# Patient Record
Sex: FEMALE | Race: WHITE | NOT HISPANIC OR LATINO | Employment: OTHER | URBAN - METROPOLITAN AREA
[De-identification: names, ages, dates, MRNs, and addresses within clinical notes are randomized per-mention and may not be internally consistent; named-entity substitution may affect disease eponyms.]

---

## 2023-02-27 LAB
ALANINE AMINOTRANSFERASE (SGPT) (U/L) IN SER/PLAS: 19 U/L (ref 7–45)
ALBUMIN (G/DL) IN SER/PLAS: 4.3 G/DL (ref 3.4–5)
ALKALINE PHOSPHATASE (U/L) IN SER/PLAS: 87 U/L (ref 33–136)
ANION GAP IN SER/PLAS: 20 MMOL/L (ref 10–20)
ASPARTATE AMINOTRANSFERASE (SGOT) (U/L) IN SER/PLAS: 21 U/L (ref 9–39)
BASOPHILS (10*3/UL) IN BLOOD BY AUTOMATED COUNT: 0.08 X10E9/L (ref 0–0.1)
BASOPHILS/100 LEUKOCYTES IN BLOOD BY AUTOMATED COUNT: 1 % (ref 0–2)
BILIRUBIN TOTAL (MG/DL) IN SER/PLAS: 0.4 MG/DL (ref 0–1.2)
C REACTIVE PROTEIN (MG/L) IN SER/PLAS: <0.1 MG/DL
CALCIUM (MG/DL) IN SER/PLAS: 9.7 MG/DL (ref 8.6–10.6)
CARBON DIOXIDE, TOTAL (MMOL/L) IN SER/PLAS: 21 MMOL/L (ref 21–32)
CHLORIDE (MMOL/L) IN SER/PLAS: 102 MMOL/L (ref 98–107)
CREATININE (MG/DL) IN SER/PLAS: 0.98 MG/DL (ref 0.5–1.05)
EOSINOPHILS (10*3/UL) IN BLOOD BY AUTOMATED COUNT: 0.31 X10E9/L (ref 0–0.4)
EOSINOPHILS/100 LEUKOCYTES IN BLOOD BY AUTOMATED COUNT: 4.1 % (ref 0–6)
ERYTHROCYTE DISTRIBUTION WIDTH (RATIO) BY AUTOMATED COUNT: 12.9 % (ref 11.5–14.5)
ERYTHROCYTE MEAN CORPUSCULAR HEMOGLOBIN CONCENTRATION (G/DL) BY AUTOMATED: 32.2 G/DL (ref 32–36)
ERYTHROCYTE MEAN CORPUSCULAR VOLUME (FL) BY AUTOMATED COUNT: 95 FL (ref 80–100)
ERYTHROCYTES (10*6/UL) IN BLOOD BY AUTOMATED COUNT: 4.39 X10E12/L (ref 4–5.2)
GFR FEMALE: 60 ML/MIN/1.73M2
GLUCOSE (MG/DL) IN SER/PLAS: 91 MG/DL (ref 74–99)
HEMATOCRIT (%) IN BLOOD BY AUTOMATED COUNT: 41.9 % (ref 36–46)
HEMOGLOBIN (G/DL) IN BLOOD: 13.5 G/DL (ref 12–16)
IMMATURE GRANULOCYTES/100 LEUKOCYTES IN BLOOD BY AUTOMATED COUNT: 0.7 % (ref 0–0.9)
LEUKOCYTES (10*3/UL) IN BLOOD BY AUTOMATED COUNT: 7.6 X10E9/L (ref 4.4–11.3)
LYMPHOCYTES (10*3/UL) IN BLOOD BY AUTOMATED COUNT: 2.05 X10E9/L (ref 0.8–3)
LYMPHOCYTES/100 LEUKOCYTES IN BLOOD BY AUTOMATED COUNT: 26.9 % (ref 13–44)
MONOCYTES (10*3/UL) IN BLOOD BY AUTOMATED COUNT: 0.55 X10E9/L (ref 0.05–0.8)
MONOCYTES/100 LEUKOCYTES IN BLOOD BY AUTOMATED COUNT: 7.2 % (ref 2–10)
NEUTROPHILS (10*3/UL) IN BLOOD BY AUTOMATED COUNT: 4.59 X10E9/L (ref 1.6–5.5)
NEUTROPHILS/100 LEUKOCYTES IN BLOOD BY AUTOMATED COUNT: 60.1 % (ref 40–80)
NRBC (PER 100 WBCS) BY AUTOMATED COUNT: 0 /100 WBC (ref 0–0)
PLATELETS (10*3/UL) IN BLOOD AUTOMATED COUNT: 304 X10E9/L (ref 150–450)
POTASSIUM (MMOL/L) IN SER/PLAS: 4.3 MMOL/L (ref 3.5–5.3)
PROTEIN TOTAL: 6.7 G/DL (ref 6.4–8.2)
SEDIMENTATION RATE, ERYTHROCYTE: 14 MM/H (ref 0–30)
SODIUM (MMOL/L) IN SER/PLAS: 139 MMOL/L (ref 136–145)
UREA NITROGEN (MG/DL) IN SER/PLAS: 22 MG/DL (ref 6–23)

## 2023-02-28 LAB
ANA PATTERN: ABNORMAL
ANA TITER: ABNORMAL
ANTI-NUCLEAR ANTIBODY (ANA): POSITIVE

## 2023-03-01 LAB
ANTI-CENTROMERE: <0.2 AI
ANTI-CHROMATIN: <0.2 AI
ANTI-DNA (DS): <1 IU/ML
ANTI-JO-1 IGG: <0.2 AI
ANTI-RIBOSOMAL P: <0.2 AI
ANTI-RNP: 0.4 AI
ANTI-SCL-70: <0.2 AI
ANTI-SM/RNP: <0.2 AI
ANTI-SM: <0.2 AI
ANTI-SSA: <0.2 AI
ANTI-SSB: <0.2 AI

## 2023-03-20 ENCOUNTER — TELEPHONE (OUTPATIENT)
Dept: PRIMARY CARE | Facility: CLINIC | Age: 78
End: 2023-03-20

## 2023-06-26 DIAGNOSIS — E78.49 OTHER HYPERLIPIDEMIA: ICD-10-CM

## 2023-06-26 RX ORDER — ROSUVASTATIN CALCIUM 5 MG/1
5 TABLET, COATED ORAL NIGHTLY
Qty: 90 TABLET | Refills: 0 | Status: SHIPPED | OUTPATIENT
Start: 2023-06-26 | End: 2023-09-25

## 2023-07-03 DIAGNOSIS — I10 HYPERTENSION, UNSPECIFIED TYPE: Primary | ICD-10-CM

## 2023-07-05 DIAGNOSIS — I10 HYPERTENSION, UNSPECIFIED TYPE: Primary | ICD-10-CM

## 2023-07-05 RX ORDER — AMLODIPINE BESYLATE 2.5 MG/1
TABLET ORAL
Qty: 90 TABLET | Refills: 3 | Status: SHIPPED | OUTPATIENT
Start: 2023-07-05

## 2023-07-06 RX ORDER — AMLODIPINE BESYLATE 5 MG/1
TABLET ORAL
Qty: 90 TABLET | Refills: 3 | Status: SHIPPED | OUTPATIENT
Start: 2023-07-06

## 2023-08-16 ENCOUNTER — APPOINTMENT (OUTPATIENT)
Dept: PRIMARY CARE | Facility: CLINIC | Age: 78
End: 2023-08-16
Payer: MEDICARE

## 2023-08-21 ENCOUNTER — OFFICE VISIT (OUTPATIENT)
Dept: PRIMARY CARE | Facility: CLINIC | Age: 78
End: 2023-08-21
Payer: MEDICARE

## 2023-08-21 VITALS
DIASTOLIC BLOOD PRESSURE: 70 MMHG | OXYGEN SATURATION: 96 % | HEIGHT: 64 IN | SYSTOLIC BLOOD PRESSURE: 136 MMHG | HEART RATE: 71 BPM | BODY MASS INDEX: 24.28 KG/M2 | WEIGHT: 142.2 LBS | RESPIRATION RATE: 16 BRPM

## 2023-08-21 DIAGNOSIS — I10 HYPERTENSION, UNSPECIFIED TYPE: ICD-10-CM

## 2023-08-21 DIAGNOSIS — Z85.820 HISTORY OF MELANOMA: ICD-10-CM

## 2023-08-21 DIAGNOSIS — R07.81 RIB PAIN: ICD-10-CM

## 2023-08-21 DIAGNOSIS — Z12.31 ENCOUNTER FOR SCREENING MAMMOGRAM FOR MALIGNANT NEOPLASM OF BREAST: ICD-10-CM

## 2023-08-21 DIAGNOSIS — E78.5 HYPERLIPIDEMIA, UNSPECIFIED HYPERLIPIDEMIA TYPE: ICD-10-CM

## 2023-08-21 DIAGNOSIS — Z00.00 ENCOUNTER FOR SUBSEQUENT ANNUAL WELLNESS VISIT IN MEDICARE PATIENT: Primary | ICD-10-CM

## 2023-08-21 DIAGNOSIS — R42 DIZZINESS: ICD-10-CM

## 2023-08-21 DIAGNOSIS — R51.9 OCCIPITAL HEADACHE: ICD-10-CM

## 2023-08-21 DIAGNOSIS — M35.3 POLYMYALGIA RHEUMATICA (MULTI): ICD-10-CM

## 2023-08-21 PROBLEM — J30.2 ACUTE SEASONAL ALLERGIC RHINITIS: Status: ACTIVE | Noted: 2023-08-21

## 2023-08-21 PROBLEM — Z12.39 SCREENING FOR BREAST CANCER: Status: ACTIVE | Noted: 2023-08-21

## 2023-08-21 PROCEDURE — 1036F TOBACCO NON-USER: CPT | Performed by: SPECIALIST

## 2023-08-21 PROCEDURE — G0439 PPPS, SUBSEQ VISIT: HCPCS | Performed by: SPECIALIST

## 2023-08-21 PROCEDURE — 1159F MED LIST DOCD IN RCRD: CPT | Performed by: SPECIALIST

## 2023-08-21 PROCEDURE — 1125F AMNT PAIN NOTED PAIN PRSNT: CPT | Performed by: SPECIALIST

## 2023-08-21 PROCEDURE — 3078F DIAST BP <80 MM HG: CPT | Performed by: SPECIALIST

## 2023-08-21 PROCEDURE — 3075F SYST BP GE 130 - 139MM HG: CPT | Performed by: SPECIALIST

## 2023-08-21 PROCEDURE — 1170F FXNL STATUS ASSESSED: CPT | Performed by: SPECIALIST

## 2023-08-21 PROCEDURE — 99397 PER PM REEVAL EST PAT 65+ YR: CPT | Performed by: SPECIALIST

## 2023-08-21 RX ORDER — PREDNISONE 1 MG/1
TABLET ORAL
COMMUNITY
Start: 2023-02-27 | End: 2023-12-27

## 2023-08-21 RX ORDER — PREDNISONE 5 MG/1
TABLET ORAL
COMMUNITY
Start: 2023-03-16

## 2023-08-21 RX ORDER — MELATONIN 3 MG
1 CAPSULE ORAL NIGHTLY PRN
COMMUNITY

## 2023-08-21 ASSESSMENT — PATIENT HEALTH QUESTIONNAIRE - PHQ9
1. LITTLE INTEREST OR PLEASURE IN DOING THINGS: NOT AT ALL
SUM OF ALL RESPONSES TO PHQ9 QUESTIONS 1 AND 2: 0
SUM OF ALL RESPONSES TO PHQ9 QUESTIONS 1 AND 2: 0
2. FEELING DOWN, DEPRESSED OR HOPELESS: NOT AT ALL
1. LITTLE INTEREST OR PLEASURE IN DOING THINGS: NOT AT ALL
2. FEELING DOWN, DEPRESSED OR HOPELESS: NOT AT ALL

## 2023-08-21 ASSESSMENT — ACTIVITIES OF DAILY LIVING (ADL)
GROCERY_SHOPPING: INDEPENDENT
TAKING_MEDICATION: INDEPENDENT
BATHING: INDEPENDENT
DRESSING: INDEPENDENT
DOING_HOUSEWORK: INDEPENDENT
MANAGING_FINANCES: INDEPENDENT

## 2023-08-21 ASSESSMENT — ENCOUNTER SYMPTOMS
DEPRESSION: 0
OCCASIONAL FEELINGS OF UNSTEADINESS: 0
LOSS OF SENSATION IN FEET: 0

## 2023-08-21 NOTE — PROGRESS NOTES
Subjective   Patient ID: Xiomara Rader is a 78 y.o. female who presents for Medicare Annual Wellness Visit Initial, Dizziness (Patient stated she has episodes of dizziness.), Shortness of Breath, Headache (Back of head.), and Hemorrhoids.      HPI    79 yo female Pmhx sig for Anxiety/Depression, PMR, Melanoma (Stage 0 right arm) Fam Hx of Melanoma here for MAW    Now on 7 mg prednisone (has 5 mg prednisone and 1 mg tabs) and needs mail order refill, sees Dr Ochoa.  Had been off of prednisone x 1 year and then PMR sx recurred.  Reduces 1 mg per month (was on 12 and goes to 6 mg in Sept)    Taking an occasional antihistamine from [facility] (a generic clairtin)  Has a lot of nasal congestion, has deviated septum and always feels congested  Uses nasal saline at night    Headache in Back of head and dizziness   Said she was told ortho-stasis with insurance home provider  Back of head headache has been present off/on for about a month, describes as a low grade dull pain and occasional zap   Took an ibuprofen earlier today     Also said dizzy for several weeks, last week lasted 2 days in a row  Said if turns quickly some times must grab something to steady herself  Can be room spinning sensation but when working around house more of an unsteady sensation, can trigger with position change, no associated presyncope no palpitations    Off/on shortness of breath episodic for an hour or two and resolves after an antihistamine    Plans to visit [place] with 3 friends and go to the [facility] in October     Seeing derm for Melanoma  (son also had)    Allergies   Allergen Reactions    Clindamycin Rash    Penicillins Rash    Sulfa (Sulfonamide Antibiotics) Rash      Current Outpatient Medications   Medication Instructions    amLODIPine (Norvasc) 2.5 mg tablet TAKE 1 TABLET DAILY (TO BE TAKEN ALONG WITH 5 MG AMLODIPINE FOR TOTAL DOSE 7.5 MG DAILY)    amLODIPine (Norvasc) 5 mg tablet TAKE 1 TABLET DAILY    melatonin 3 mg capsule 1 tablet,  "oral, Nightly PRN    predniSONE (Deltasone) 1 mg tablet oral    predniSONE (Deltasone) 5 mg tablet oral    rosuvastatin (CRESTOR) 5 mg, oral, Nightly        Review of Systems  Constitutional  No fatigue, no fevers, no chills, no unintentional weight loss,   HEENT:  Positive headaches, Episodic unsteadiness and dizziness, no double vision, no blurred vision, no hearing loss  Cardiovascular:  No chest pain, no palpitations, no shortness of breath with exertion (one flight of stairs)  Respiratory:  No cough, no hemoptysis, no wheezing, Occasional if she doesn't take an antihistamine shortness of breath at rest  GI:  No dysphagia, no odynophagia, Occasional reflux if eats tomato based food, no abdominal pain, no nausea, no vomiting, no changes in bowel bladder habits, no bright red blood per rectum, no melena  :  No urinary frequency, no dysuria, no urine incontinence  MSK:  No falls, no joint pain, no joint swelling  Neuro:  No tremors, no extremity weakness, no changes in sensation  Breasts:  No abnormalities on self breast exam no nipple discharge no skin changes    Physical Exam  /70   Pulse 71   Resp 16   Ht 1.623 m (5' 3.9\")   Wt 64.5 kg (142 lb 3.2 oz)   SpO2 96%   BMI 24.49 kg/m²   Supine HR 65 140/78 Standing HR 70 and /70 (did not drop below  upon when first stood)   General:    Well-appearing  F in no acute distress, well nourished, well hydrated  Head:  Normocephalic, atraumatic  Skin:          Warm dry,  Eyes:  Anicteric sclera, pupils equal,   Ears:        TMs intact  Oral:      Not examined due to pandemic  Neck:   Supple, no cervical/supraclavicular adenopathy, no thyromegaly or nodules appreciated on exam  Cor:      Regular rate, normal S1, S2, no murmurs appreciated, no S3, no S4   Lungs:   Clear to auscultation b/l, no wheezes, no rhonchi, no crackles, no accessory respiratory muscle use  Abd:          Soft, nontender, no guarding, no rebound, no hepatosplenomegaly " appreciated   Ext:            No lower extremity edema, no palpable cords  Pulses:      Pedal pulses intact  Neuro:   CN2-12 grossly intact, cerebellar function intact with finger nose, heel shin, no pronator drift, heel-toe gait intact, tip toe gait and heel gait intact, negative Rhomberg, Kaur pike performed with head left no nystagmus but reproduced minor symptoms   Breasts:     Declined,     Assessment/Plan   Problem List Items Addressed This Visit       Encounter for subsequent annual wellness visit in Medicare patient - Primary     -Continue annual influenza vaccine   -Previously received 2 Covid vaccines 2 boosters and bivalent vaccine 9/2022, plans to get covid booster  -Previously received Shingrix vaccines  -Previously received PPSV 23 1/2013, recommend repeat PPSV23 or PCV20  -Previously received PCV 13 10/2019  -Previously received Tdap 10/2021  -Plans to get RSV vaccine this fall  -Mammogram 3/2022, discussed, ordered  -Colonoscopy 2014 in [facility], said no further recommended  -Prior Screen Hep C 12/2019  -DXA 4/2023 was normal (prior was osteopenia), management per Rheum         Relevant Orders    CBC (Completed)    Comprehensive Metabolic Panel (Completed)    Lipid Panel (Completed)    Hypertension     /70 today  Recommended home blood pressure monitoring  Continue 7.5 mg amlodipine daily   Goal less than 140/90, ideally less than 130/70           Relevant Orders    CBC (Completed)    Comprehensive Metabolic Panel (Completed)    Hyperlipidemia     On rosuvastatin 5 mg daily  LDL was 113 (2021)  Labs ordered CMP Fx Lipids         Relevant Orders    Comprehensive Metabolic Panel (Completed)    Lipid Panel (Completed)    Polymyalgia rheumatica (CMS/HCC)     Management per Rheum  On Prednisone taper (On 7 mg prednisone, has 5 mg prednisone and 1 mg tabs, and reduces 1 mg per month (was on 12 and goes to 6 mg in Sept)           Relevant Orders    CBC (Completed)    Screening for breast cancer     Relevant Orders    BI mammo bilateral screening tomosynthesis    History of melanoma     Management per dermatology         Occipital headache     Neuro exam today non-focal  Discussed imaging with MRI brain, said she would consider if symptoms persist          Dizziness     Neuro exam normal today  Discussed MRI Brain, she will consider if symptoms persist  Was not orthostatic today   Suspect BPPV given mild symptoms on cheema pike maneuver, consider vestibular therapy if sx persist         Rib pain     Pain over right lower rib cage  Declined xray, opted to monitor clinically and let me know if sx worsen or fail to resolve                 Elli Quevedo, DO

## 2023-08-21 NOTE — ASSESSMENT & PLAN NOTE
-Continue annual influenza vaccine   -Previously received 2 Covid vaccines 2 boosters and bivalent vaccine 9/2022, plans to get covid booster  -Previously received Shingrix vaccines  -Previously received PPSV 23 1/2013, recommend repeat PPSV23 or PCV20  -Previously received PCV 13 10/2019  -Previously received Tdap 10/2021  -Plans to get RSV vaccine this fall  -Mammogram 3/2022, discussed, ordered  -Colonoscopy 2014 in [facility], said no further recommended  -Prior Screen Hep C 12/2019  -DXA 4/2023 was normal (prior was osteopenia), management per Rheum

## 2023-08-23 ENCOUNTER — LAB (OUTPATIENT)
Dept: LAB | Facility: LAB | Age: 78
End: 2023-08-23
Payer: MEDICARE

## 2023-08-23 DIAGNOSIS — Z00.00 ENCOUNTER FOR SUBSEQUENT ANNUAL WELLNESS VISIT IN MEDICARE PATIENT: ICD-10-CM

## 2023-08-23 DIAGNOSIS — M35.3 POLYMYALGIA RHEUMATICA (MULTI): ICD-10-CM

## 2023-08-23 DIAGNOSIS — I10 HYPERTENSION, UNSPECIFIED TYPE: ICD-10-CM

## 2023-08-23 DIAGNOSIS — E78.5 HYPERLIPIDEMIA, UNSPECIFIED HYPERLIPIDEMIA TYPE: ICD-10-CM

## 2023-08-23 LAB
ALANINE AMINOTRANSFERASE (SGPT) (U/L) IN SER/PLAS: 18 U/L (ref 7–45)
ALBUMIN (G/DL) IN SER/PLAS: 4.1 G/DL (ref 3.4–5)
ALKALINE PHOSPHATASE (U/L) IN SER/PLAS: 84 U/L (ref 33–136)
ANION GAP IN SER/PLAS: 10 MMOL/L (ref 10–20)
ASPARTATE AMINOTRANSFERASE (SGOT) (U/L) IN SER/PLAS: 18 U/L (ref 9–39)
BILIRUBIN TOTAL (MG/DL) IN SER/PLAS: 0.6 MG/DL (ref 0–1.2)
CALCIUM (MG/DL) IN SER/PLAS: 9.4 MG/DL (ref 8.6–10.6)
CARBON DIOXIDE, TOTAL (MMOL/L) IN SER/PLAS: 29 MMOL/L (ref 21–32)
CHLORIDE (MMOL/L) IN SER/PLAS: 107 MMOL/L (ref 98–107)
CHOLESTEROL (MG/DL) IN SER/PLAS: 153 MG/DL (ref 0–199)
CHOLESTEROL IN HDL (MG/DL) IN SER/PLAS: 81.1 MG/DL
CHOLESTEROL/HDL RATIO: 1.9
CREATININE (MG/DL) IN SER/PLAS: 0.89 MG/DL (ref 0.5–1.05)
ERYTHROCYTE DISTRIBUTION WIDTH (RATIO) BY AUTOMATED COUNT: 13.1 % (ref 11.5–14.5)
ERYTHROCYTE MEAN CORPUSCULAR HEMOGLOBIN CONCENTRATION (G/DL) BY AUTOMATED: 32.4 G/DL (ref 32–36)
ERYTHROCYTE MEAN CORPUSCULAR VOLUME (FL) BY AUTOMATED COUNT: 99 FL (ref 80–100)
ERYTHROCYTES (10*6/UL) IN BLOOD BY AUTOMATED COUNT: 4.38 X10E12/L (ref 4–5.2)
GFR FEMALE: 67 ML/MIN/1.73M2
GLUCOSE (MG/DL) IN SER/PLAS: 86 MG/DL (ref 74–99)
HEMATOCRIT (%) IN BLOOD BY AUTOMATED COUNT: 43.5 % (ref 36–46)
HEMOGLOBIN (G/DL) IN BLOOD: 14.1 G/DL (ref 12–16)
LDL: 58 MG/DL (ref 0–99)
LEUKOCYTES (10*3/UL) IN BLOOD BY AUTOMATED COUNT: 6.3 X10E9/L (ref 4.4–11.3)
NRBC (PER 100 WBCS) BY AUTOMATED COUNT: 0 /100 WBC (ref 0–0)
PLATELETS (10*3/UL) IN BLOOD AUTOMATED COUNT: 288 X10E9/L (ref 150–450)
POTASSIUM (MMOL/L) IN SER/PLAS: 4.5 MMOL/L (ref 3.5–5.3)
PROTEIN TOTAL: 6.6 G/DL (ref 6.4–8.2)
SODIUM (MMOL/L) IN SER/PLAS: 141 MMOL/L (ref 136–145)
TRIGLYCERIDE (MG/DL) IN SER/PLAS: 70 MG/DL (ref 0–149)
UREA NITROGEN (MG/DL) IN SER/PLAS: 16 MG/DL (ref 6–23)
VLDL: 14 MG/DL (ref 0–40)

## 2023-08-23 PROCEDURE — 36415 COLL VENOUS BLD VENIPUNCTURE: CPT

## 2023-08-23 PROCEDURE — 80061 LIPID PANEL: CPT

## 2023-08-23 PROCEDURE — 85027 COMPLETE CBC AUTOMATED: CPT

## 2023-08-23 PROCEDURE — 80053 COMPREHEN METABOLIC PANEL: CPT

## 2023-08-24 PROBLEM — R07.81 RIB PAIN: Status: ACTIVE | Noted: 2023-08-24

## 2023-08-24 PROBLEM — R42 DIZZINESS: Status: ACTIVE | Noted: 2023-08-24

## 2023-08-24 PROBLEM — R51.9 OCCIPITAL HEADACHE: Status: ACTIVE | Noted: 2023-08-24

## 2023-08-24 PROBLEM — Z85.820 HISTORY OF MELANOMA: Status: ACTIVE | Noted: 2023-08-24

## 2023-08-24 NOTE — ASSESSMENT & PLAN NOTE
Pain over right lower rib cage  Declined xray, opted to monitor clinically and let me know if sx worsen or fail to resolve

## 2023-08-24 NOTE — ASSESSMENT & PLAN NOTE
Neuro exam normal today  Discussed MRI Brain, she will consider if symptoms persist  Was not orthostatic today   Suspect BPPV given mild symptoms on cheema pike maneuver, consider vestibular therapy if sx persist

## 2023-08-24 NOTE — ASSESSMENT & PLAN NOTE
/70 today  Recommended home blood pressure monitoring  Continue 7.5 mg amlodipine daily   Goal less than 140/90, ideally less than 130/70

## 2023-08-24 NOTE — ASSESSMENT & PLAN NOTE
Neuro exam today non-focal  Discussed imaging with MRI brain, said she would consider if symptoms persist

## 2023-08-24 NOTE — ASSESSMENT & PLAN NOTE
Management per Rheum  On Prednisone taper (On 7 mg prednisone, has 5 mg prednisone and 1 mg tabs, and reduces 1 mg per month (was on 12 and goes to 6 mg in Sept)

## 2023-09-22 DIAGNOSIS — E78.49 OTHER HYPERLIPIDEMIA: ICD-10-CM

## 2023-09-25 ENCOUNTER — TELEPHONE (OUTPATIENT)
Dept: PRIMARY CARE | Facility: CLINIC | Age: 78
End: 2023-09-25
Payer: MEDICARE

## 2023-09-25 RX ORDER — ROSUVASTATIN CALCIUM 5 MG/1
5 TABLET, COATED ORAL NIGHTLY
Qty: 90 TABLET | Refills: 2 | Status: SHIPPED | OUTPATIENT
Start: 2023-09-25

## 2023-10-30 ENCOUNTER — TELEPHONE (OUTPATIENT)
Dept: PRIMARY CARE | Facility: CLINIC | Age: 78
End: 2023-10-30
Payer: MEDICARE

## 2023-10-30 NOTE — TELEPHONE ENCOUNTER
Patient just came back from [place] and tested positive today for covid she has lost of taste but no other symptoms.

## 2023-12-10 ASSESSMENT — DERMATOLOGY QUALITY OF LIFE (QOL) ASSESSMENT
RATE HOW BOTHERED YOU ARE BY EFFECTS OF YOUR SKIN PROBLEMS ON YOUR ACTIVITIES (EG, GOING OUT, ACCOMPLISHING WHAT YOU WANT, WORK ACTIVITIES OR YOUR RELATIONSHIPS WITH OTHERS): 0 - NEVER BOTHERED
RATE HOW EMOTIONALLY BOTHERED YOU ARE BY YOUR SKIN PROBLEM (FOR EXAMPLE, WORRY, EMBARRASSMENT, FRUSTRATION): 0 - NEVER BOTHERED
RATE HOW EMOTIONALLY BOTHERED YOU ARE BY YOUR SKIN PROBLEM (FOR EXAMPLE, WORRY, EMBARRASSMENT, FRUSTRATION): 0 - NEVER BOTHERED
RATE HOW BOTHERED YOU ARE BY EFFECTS OF YOUR SKIN PROBLEMS ON YOUR ACTIVITIES (EG, GOING OUT, ACCOMPLISHING WHAT YOU WANT, WORK ACTIVITIES OR YOUR RELATIONSHIPS WITH OTHERS): 0 - NEVER BOTHERED

## 2023-12-12 ENCOUNTER — OFFICE VISIT (OUTPATIENT)
Dept: DERMATOLOGY | Facility: CLINIC | Age: 78
End: 2023-12-12
Payer: MEDICARE

## 2023-12-12 DIAGNOSIS — D22.9 MULTIPLE BENIGN NEVI: Primary | ICD-10-CM

## 2023-12-12 DIAGNOSIS — L81.4 LENTIGO: ICD-10-CM

## 2023-12-12 DIAGNOSIS — L82.1 SEBORRHEIC KERATOSIS: ICD-10-CM

## 2023-12-12 DIAGNOSIS — Z85.820 PERSONAL HISTORY OF MALIGNANT MELANOMA OF SKIN: ICD-10-CM

## 2023-12-12 PROCEDURE — 1160F RVW MEDS BY RX/DR IN RCRD: CPT | Performed by: DERMATOLOGY

## 2023-12-12 PROCEDURE — 1159F MED LIST DOCD IN RCRD: CPT | Performed by: DERMATOLOGY

## 2023-12-12 PROCEDURE — 1036F TOBACCO NON-USER: CPT | Performed by: DERMATOLOGY

## 2023-12-12 PROCEDURE — 1125F AMNT PAIN NOTED PAIN PRSNT: CPT | Performed by: DERMATOLOGY

## 2023-12-12 PROCEDURE — 99213 OFFICE O/P EST LOW 20 MIN: CPT | Performed by: DERMATOLOGY

## 2023-12-12 ASSESSMENT — ITCH NUMERIC RATING SCALE: HOW SEVERE IS YOUR ITCHING?: 0

## 2023-12-12 ASSESSMENT — PATIENT GLOBAL ASSESSMENT (PGA): PATIENT GLOBAL ASSESSMENT: PATIENT GLOBAL ASSESSMENT:  1 - CLEAR

## 2023-12-12 ASSESSMENT — DERMATOLOGY PATIENT ASSESSMENT
DO YOU USE A TANNING BED: YES, PREVIOUSLY
ARE YOU AN ORGAN TRANSPLANT RECIPIENT: NO
DO YOU USE SUNSCREEN: OCCASIONALLY
HAVE YOU HAD OR DO YOU HAVE VASCULAR DISEASE: NO
ARE YOU ON BIRTH CONTROL: NO
DO YOU HAVE IRREGULAR MENSTRUAL CYCLES: NO
HAVE YOU HAD OR DO YOU HAVE A STAPH INFECTION: NO
ARE YOU TRYING TO GET PREGNANT: NO

## 2023-12-12 ASSESSMENT — DERMATOLOGY QUALITY OF LIFE (QOL) ASSESSMENT
RATE HOW EMOTIONALLY BOTHERED YOU ARE BY YOUR SKIN PROBLEM (FOR EXAMPLE, WORRY, EMBARRASSMENT, FRUSTRATION): 0 - NEVER BOTHERED
RATE HOW BOTHERED YOU ARE BY EFFECTS OF YOUR SKIN PROBLEMS ON YOUR ACTIVITIES (EG, GOING OUT, ACCOMPLISHING WHAT YOU WANT, WORK ACTIVITIES OR YOUR RELATIONSHIPS WITH OTHERS): 0 - NEVER BOTHERED
DATE THE QUALITY-OF-LIFE ASSESSMENT WAS COMPLETED: 66820
RATE HOW BOTHERED YOU ARE BY SYMPTOMS OF YOUR SKIN PROBLEM (EG, ITCHING, STINGING BURNING, HURTING OR SKIN IRRITATION): 0 - NEVER BOTHERED

## 2023-12-12 NOTE — PROGRESS NOTES
Subjective     Xiomara Rader is a 78 y.o. female who presents for the following: Skin Check.     Review of Systems:  No other skin or systemic complaints other than what is documented elsewhere in the note.    The following portions of the chart were reviewed this encounter and updated as appropriate:  Tobacco  Allergies  Meds  Problems  Med Hx  Surg Hx         Skin Cancer History  No skin cancer on file.      Specialty Problems          Dermatology Problems    History of melanoma     Melanoma 1: Melanoma in situYear Diagnosed:  2023. January.Location:  Right Dorsal Forearm.Treatment(s):  Mohs 4-4-23              Objective   Well appearing patient in no apparent distress; mood and affect are within normal limits.    A full examination was performed including scalp, head, eyes, ears, nose, lips, neck, chest, axillae, abdomen, back, buttocks, bilateral upper extremities, bilateral lower extremities, hands, feet, fingers, toes, fingernails, and toenails. All findings within normal limits unless otherwise noted below.    Assessment/Plan   1. Multiple benign nevi  Brown and tan macules and papules with reassuring findings on dermoscopy    -These lesions have benign, reassuring patterns on dermoscopy  -Recommend continued self observation, and to contact the office if any changes in nevi are noticed    2. Lentigo  Tan macules    -Benign appearing on exam  -Reassurance, recommend observation    3. Seborrheic keratosis  Stuck on, waxy macule(s)/papule(s)/plaque(s) with comedo-like openings and milia like cysts    -Discussed the nature of the diagnosis  -Reassurance, recommend continued observation    4. Personal history of malignant melanoma of skin  Lymph node basins palpated in relevant regions without appreciable adenopathy; regions include the neck and/or supraclavicular and/or axillary and/or inguinal and/or popliteal skin.    Personal History of Malignant Melanoma  -Well healed scar(s) with no evidence of  recurrence  -Discussed the need for regular full skin examinations in the office, and monthly self examinations at home  -Discussed the need for annual ophthalmology exams with dilation  -Discussed concerning lesions and when to return sooner if any changes noted in skin lesions. Patient verbalizes understanding.    Related Procedures  Follow Up In Dermatology - Established Patient      Follow up 6 months Full Skin Exam  If normal atnext visit can consider 1 year

## 2023-12-23 DIAGNOSIS — M35.3 POLYMYALGIA RHEUMATICA (MULTI): Primary | ICD-10-CM

## 2023-12-27 RX ORDER — PREDNISONE 1 MG/1
TABLET ORAL
Qty: 120 TABLET | Refills: 8 | Status: SHIPPED | OUTPATIENT
Start: 2023-12-27 | End: 2024-04-08 | Stop reason: SDUPTHER

## 2024-04-08 DIAGNOSIS — M35.3 POLYMYALGIA RHEUMATICA (MULTI): ICD-10-CM

## 2024-04-08 RX ORDER — PREDNISONE 1 MG/1
TABLET ORAL
Qty: 90 TABLET | Refills: 0 | Status: SHIPPED | OUTPATIENT
Start: 2024-04-08

## 2024-06-10 ENCOUNTER — OFFICE VISIT (OUTPATIENT)
Dept: RHEUMATOLOGY | Facility: CLINIC | Age: 79
End: 2024-06-10
Payer: MEDICARE

## 2024-06-10 VITALS
WEIGHT: 140.6 LBS | DIASTOLIC BLOOD PRESSURE: 77 MMHG | HEART RATE: 82 BPM | BODY MASS INDEX: 24.21 KG/M2 | OXYGEN SATURATION: 96 % | SYSTOLIC BLOOD PRESSURE: 120 MMHG

## 2024-06-10 DIAGNOSIS — M19.042 PRIMARY OSTEOARTHRITIS OF BOTH HANDS: ICD-10-CM

## 2024-06-10 DIAGNOSIS — M81.0 OSTEOPOROSIS, UNSPECIFIED OSTEOPOROSIS TYPE, UNSPECIFIED PATHOLOGICAL FRACTURE PRESENCE: Primary | ICD-10-CM

## 2024-06-10 DIAGNOSIS — M19.041 PRIMARY OSTEOARTHRITIS OF BOTH HANDS: ICD-10-CM

## 2024-06-10 DIAGNOSIS — M35.3 POLYMYALGIA RHEUMATICA (MULTI): ICD-10-CM

## 2024-06-10 DIAGNOSIS — M79.10 MYALGIA: ICD-10-CM

## 2024-06-10 PROBLEM — D18.01 HEMANGIOMA OF SKIN AND SUBCUTANEOUS TISSUE: Status: ACTIVE | Noted: 2023-06-22

## 2024-06-10 PROBLEM — M25.50 ARTHRALGIA: Status: ACTIVE | Noted: 2024-06-10

## 2024-06-10 PROBLEM — L81.4 OTHER MELANIN HYPERPIGMENTATION: Status: ACTIVE | Noted: 2023-06-22

## 2024-06-10 PROBLEM — D64.9 ANEMIA: Status: ACTIVE | Noted: 2024-06-10

## 2024-06-10 PROBLEM — R53.83 FATIGUE: Status: ACTIVE | Noted: 2024-06-10

## 2024-06-10 PROBLEM — D22.70 MELANOCYTIC NEVI OF UNSPECIFIED LOWER LIMB, INCLUDING HIP: Status: ACTIVE | Noted: 2023-06-22

## 2024-06-10 PROBLEM — S46.012A ROTATOR CUFF STRAIN, LEFT, INITIAL ENCOUNTER: Status: ACTIVE | Noted: 2024-06-10

## 2024-06-10 PROBLEM — N81.6 RECTOCELE, FEMALE: Status: ACTIVE | Noted: 2024-06-10

## 2024-06-10 PROBLEM — L90.5 SCAR CONDITION AND FIBROSIS OF SKIN: Status: ACTIVE | Noted: 2023-06-22

## 2024-06-10 PROBLEM — M79.672 LEFT FOOT PAIN: Status: ACTIVE | Noted: 2024-06-10

## 2024-06-10 PROBLEM — M25.562 LEFT KNEE PAIN: Status: ACTIVE | Noted: 2024-06-10

## 2024-06-10 PROBLEM — M25.662 STIFFNESS OF LEFT KNEE, NOT ELSEWHERE CLASSIFIED: Status: ACTIVE | Noted: 2024-06-10

## 2024-06-10 PROBLEM — M17.12 LOCALIZED PRIMARY OSTEOARTHRITIS OF LEFT LOWER LEG: Status: ACTIVE | Noted: 2024-06-10

## 2024-06-10 PROBLEM — M25.519 SHOULDER PAIN: Status: ACTIVE | Noted: 2024-06-10

## 2024-06-10 PROBLEM — T78.40XA ALLERGIES: Status: ACTIVE | Noted: 2024-06-10

## 2024-06-10 PROBLEM — N81.10 CYSTOCELE, UNSPECIFIED: Status: ACTIVE | Noted: 2024-06-10

## 2024-06-10 PROBLEM — R00.0 RAPID HEART RATE: Status: ACTIVE | Noted: 2024-06-10

## 2024-06-10 PROBLEM — F41.9 ANXIETY AND DEPRESSION: Status: ACTIVE | Noted: 2024-06-10

## 2024-06-10 PROBLEM — R19.7 DIARRHEA: Status: ACTIVE | Noted: 2024-06-10

## 2024-06-10 PROBLEM — D22.62 MELANOCYTIC NEVI OF LEFT UPPER LIMB, INCLUDING SHOULDER: Status: ACTIVE | Noted: 2023-06-22

## 2024-06-10 PROBLEM — E73.9 LACTOSE INTOLERANCE: Status: ACTIVE | Noted: 2024-06-10

## 2024-06-10 PROBLEM — M19.90 OSTEOARTHRITIS: Status: ACTIVE | Noted: 2024-06-10

## 2024-06-10 PROBLEM — M75.81 ROTATOR CUFF TENDONITIS, RIGHT: Status: ACTIVE | Noted: 2024-06-10

## 2024-06-10 PROBLEM — M77.52: Status: ACTIVE | Noted: 2024-06-10

## 2024-06-10 PROBLEM — F32.A ANXIETY AND DEPRESSION: Status: ACTIVE | Noted: 2024-06-10

## 2024-06-10 PROBLEM — M85.80 OSTEOPENIA: Status: ACTIVE | Noted: 2024-06-10

## 2024-06-10 PROBLEM — D22.5 MELANOCYTIC NEVI OF TRUNK: Status: ACTIVE | Noted: 2023-06-22

## 2024-06-10 PROBLEM — D03.61 MELANOMA IN SITU OF RIGHT UPPER LIMB, INCLUDING SHOULDER (MULTI): Status: ACTIVE | Noted: 2023-06-22

## 2024-06-10 PROBLEM — L82.1 OTHER SEBORRHEIC KERATOSIS: Status: ACTIVE | Noted: 2023-06-22

## 2024-06-10 PROBLEM — R94.39 ABNORMAL STRESS ECHO: Status: ACTIVE | Noted: 2024-06-10

## 2024-06-10 PROBLEM — D48.5 NEOPLASM OF UNCERTAIN BEHAVIOR OF SKIN: Status: ACTIVE | Noted: 2023-06-22

## 2024-06-10 PROCEDURE — 1036F TOBACCO NON-USER: CPT | Performed by: STUDENT IN AN ORGANIZED HEALTH CARE EDUCATION/TRAINING PROGRAM

## 2024-06-10 PROCEDURE — 3074F SYST BP LT 130 MM HG: CPT | Performed by: STUDENT IN AN ORGANIZED HEALTH CARE EDUCATION/TRAINING PROGRAM

## 2024-06-10 PROCEDURE — 3078F DIAST BP <80 MM HG: CPT | Performed by: STUDENT IN AN ORGANIZED HEALTH CARE EDUCATION/TRAINING PROGRAM

## 2024-06-10 PROCEDURE — 1126F AMNT PAIN NOTED NONE PRSNT: CPT | Performed by: STUDENT IN AN ORGANIZED HEALTH CARE EDUCATION/TRAINING PROGRAM

## 2024-06-10 PROCEDURE — 99214 OFFICE O/P EST MOD 30 MIN: CPT | Performed by: STUDENT IN AN ORGANIZED HEALTH CARE EDUCATION/TRAINING PROGRAM

## 2024-06-10 PROCEDURE — 1159F MED LIST DOCD IN RCRD: CPT | Performed by: STUDENT IN AN ORGANIZED HEALTH CARE EDUCATION/TRAINING PROGRAM

## 2024-06-10 ASSESSMENT — PAIN SCALES - GENERAL: PAINLEVEL: 0-NO PAIN

## 2024-06-10 NOTE — PATIENT INSTRUCTIONS
Go back to the 5 crestor and see if the pain comes back    If it does, then you can discuss with Dr Quevedo about either dose reducing or switching statins or adding coenzyme q    Repeat your bone density 4/2025 at [facility]

## 2024-06-10 NOTE — PROGRESS NOTES
Subjective   Patient ID: Xiomara Rader is a 79 y.o. female who presents for No chief complaint on file..  HPI:  F with hx of inflammatory marker normal PMR  left knee TKA, established care with me 2/2023, dx with relapsed PMR. Had been off steroids from 2021-2/2023.  Restarted prednisone 12 mg daily February 2023, patient tapered off May 2024.      Dx with PMR around Dec 2020. Remarkable response to prednisone, stated felt great on 12 mg daily, Tapered off gradually and stopped 2021. Saw Rheum 2021, was rx'ed alendronate for OP based off frax but patient never started it as she states her pharmacy friend told her not to.  Repeat bone density was done May 2023, edxa read as normal but FRAX 15.66% and hip 2.88%; if corrected by GIOP guidelines as patient is on pred >7.5 mg qday, FRAX is 18% for MOF and 3.4% ; patient did not want to start treatment.  PMR like sxs flared end of 2022. Started patient on pred 12 mg daily 2/2023, she feels 100 % better on it. She got off it May 2024.      Her symptomatology for PMR is as follows: Her joint pain is in her shoulders, elbows, fingers. It starts in the AM for about 45 min. It also bothers her and off throughout the day. If she gets up and stands up to walk , it hurts.      Gets sores in nose when her house is dry. Has 30 min of back stiffness in AM. Has dry mouth at night . Hx of 2 miscarriages in her 20s. Denies fevers , chills, unintentional weight loss, rashes, alopecia, mouth sores, photosensitivity, Raynauds, dry eyes, blood clots, rheum fam hx        Labs show: nl CBC. nl CMP, nl ESR, nl CRP, KAIT 1:160 but neg VIRGILIO including dsdna, Sm, RNP, Sm/RNP, SSA, SSB, Scl-70, centromere, Anais-1, chromatin, ribosomal pGoing down to pred 10        Her sxs seem to be most c/w hand OA, PMR, and OP     DEXA 4/2023 read as nl, but FRAX for 15.66% and hip 2.88%       Objective   There were no vitals taken for this visit.      Physical Exam  Constitutional: Alert and in no acute distress.  Well developed, well nourished  Head and Face: Head and face: Normal.    Musculoskeletal: No synovitis throughout, able to abduct shoulders normally, no pelvic girdle pain         Lab Results   Component Value Date    WBC 6.3 08/23/2023    HGB 14.1 08/23/2023    HCT 43.5 08/23/2023     08/23/2023    ALT 18 08/23/2023    AST 18 08/23/2023    CREATININE 0.89 08/23/2023          Lab Results   Component Value Date    ANAPATTRN HOMOGENEOUS 02/27/2023    ANATITER 1:160 02/27/2023    KAIT POSITIVE (A) 02/27/2023    ASSB <0.2 02/27/2023    ACEN <0.2 02/27/2023    SEDRATE 14 02/27/2023    CRP <0.10 02/27/2023   \\            There is currently no information documented on the homunculus. Go to the Rheumatology activity and complete the homunculus joint exam.      === 04/10/23 ===    DEXA BONE DENSITY    - Impression -  DEXA:  According to World Health Organization criteria,  classification is  normal.    Followup recommended in 2 years or sooner as clinically warranted.    All images and detailed analysis are available on the  Radiology  PACS.    Assessment/Plan:     #hand OA  -no pain since starting steroids for PMR relapse 2/2023  -continue voltaren gel and otc tylenol and ibuprofen sparingly (had already been on this per PMD)  -can trial paraffin baths and arthritis gloves  -does not want OT referral at this time     # PMR relapse  -could get RF and CCP with next routine lab although less likely  -has always had nl inflammatory markers so likely cannot follow  - off prednisone since May 2024  -Side effects of systemic steroids were discussed. These side effects come from the ACR patient hand out and are including but not limited to bruising, osteoporosis (or weakened bones), diabetes, infection, hypertension, weight gain, cataracts, glaucoma, and a bone disorder called avascular necrosis. We discussed that most side effects are related to the dose administered and duration of treatment, so the goal is to use it at  the lowest effective dose for the shortest period of time necessary. Discussed that though prednisone rarely has a direct interaction with other medications. Patient handout given. Patient understanding and accepting of risks.  -Patient counseled on symptoms of giant cell arteritis and instructed to go to the ED immediately ([facility] is preferable) if they get new vision changes or severe headache, they were instructed that they should tell the ED physician that their rheumatologist has told them that they may be at risk for giant cell arteritis and high dose steroids should be started while they are working it up.  -Patient has had moon facies and dorsal fat pad with prednisone before and wants to get off it as it is possible; discussed that if we confirm the diagnosis of PMR we could potentially add a steroid sparing agent such as methotrexate however steroids unfortunately are not the mainstay of treatment for PMR     #4/2023 dexa read as normal but FRAX 15.66% and hip 2.88%;   -repeat bone density at [facility] should be done  April 2025-ordered June 2024  -never been treated for OP  -She is taking daily ca and vit d     #+KAIT, neg VIRGILIO  -not significant at this time    #Concern for statin myalgias  -Patient has concerned that she might have statin myalgia so she decreased her rosuvastatin to half the dose; I told her she should go up to the full dose again, and if her pain recurs, then she should talk with Dr. Quevedo about dose reducing versus adding coenzyme Q versus switching to another statin  -At this time, I am not convinced patient's pain is statin myalgias, which his wife had for her to go back up to the rosuvastatin 5 daily, and test again        #Preventative Health Recommendations for Primary Care Providers     Vaccine Recommendations:     From ACR 2022 vaccine recommendations:     For Rheumatic and musculoskeletal disease (RMD) patients aged greater than or equal to 65 years, and  RMD patients aged >18 and <65 years who are on immunosuppressive medication, giving high-dose or adjuvanted influenza vaccination is conditionally  recommended over giving regular-dose influenza vaccination.     For patients with RMD aged <65 years who are on immunosuppressive medication, pneumococcal vaccination is strongly recommended.     For patients with RMD aged >18 years who are on immunosuppressive medication, administering the recombinant zoster vaccine is strongly recommended.     For patients with RMD aged >26 and <45 years who are on immunosuppressive medication and not previously vaccinated, vaccination against HPV is conditionally recommended.     Cardiovascular Recommendations:     Patients with inflammatory diseases are at high risk for cardiovascular disease, and should be aggressively screened by primary care physicians and started on lipid-lowering medications when appropriate.     Bone Health Recommendations:     Patients on steroids should be on calcium and Vitamin D. Patients with inflammatory rheumatic diseases are higher risk for osteoporosis and should have baseline DEXA screening.      Patient counseled to seek medical care if any new or worsening symptoms, urgently if needed.      Note will be sent to primary care doctor.     Return to clinic in 1 year, sooner if needed     Dragon dictation software was used to dictate this note. Errors may have occurred during dictation that was not intended by the user.

## 2024-06-11 ENCOUNTER — APPOINTMENT (OUTPATIENT)
Dept: DERMATOLOGY | Facility: CLINIC | Age: 79
End: 2024-06-11
Payer: MEDICARE

## 2024-06-11 DIAGNOSIS — D22.9 MULTIPLE BENIGN NEVI: Primary | ICD-10-CM

## 2024-06-11 DIAGNOSIS — Z85.820 PERSONAL HISTORY OF MALIGNANT MELANOMA OF SKIN: ICD-10-CM

## 2024-06-11 DIAGNOSIS — L81.4 LENTIGO: ICD-10-CM

## 2024-06-11 DIAGNOSIS — Z12.83 SCREENING EXAM FOR SKIN CANCER: ICD-10-CM

## 2024-06-11 DIAGNOSIS — L82.1 SEBORRHEIC KERATOSIS: ICD-10-CM

## 2024-06-11 DIAGNOSIS — L57.8 ACTINIC SKIN DAMAGE: ICD-10-CM

## 2024-06-11 PROBLEM — D18.01 HEMANGIOMA OF SKIN AND SUBCUTANEOUS TISSUE: Status: RESOLVED | Noted: 2023-06-22 | Resolved: 2024-06-11

## 2024-06-11 PROBLEM — L90.5 SCAR CONDITION AND FIBROSIS OF SKIN: Status: RESOLVED | Noted: 2023-06-22 | Resolved: 2024-06-11

## 2024-06-11 PROBLEM — D22.62 MELANOCYTIC NEVI OF LEFT UPPER LIMB, INCLUDING SHOULDER: Status: RESOLVED | Noted: 2023-06-22 | Resolved: 2024-06-11

## 2024-06-11 PROBLEM — D22.5 MELANOCYTIC NEVI OF TRUNK: Status: RESOLVED | Noted: 2023-06-22 | Resolved: 2024-06-11

## 2024-06-11 PROBLEM — D48.5 NEOPLASM OF UNCERTAIN BEHAVIOR OF SKIN: Status: RESOLVED | Noted: 2023-06-22 | Resolved: 2024-06-11

## 2024-06-11 PROBLEM — D22.70 MELANOCYTIC NEVI OF UNSPECIFIED LOWER LIMB, INCLUDING HIP: Status: RESOLVED | Noted: 2023-06-22 | Resolved: 2024-06-11

## 2024-06-11 PROCEDURE — 1159F MED LIST DOCD IN RCRD: CPT | Performed by: DERMATOLOGY

## 2024-06-11 PROCEDURE — 1160F RVW MEDS BY RX/DR IN RCRD: CPT | Performed by: DERMATOLOGY

## 2024-06-11 PROCEDURE — 1036F TOBACCO NON-USER: CPT | Performed by: DERMATOLOGY

## 2024-06-11 PROCEDURE — 99213 OFFICE O/P EST LOW 20 MIN: CPT | Performed by: DERMATOLOGY

## 2024-06-11 ASSESSMENT — DERMATOLOGY QUALITY OF LIFE (QOL) ASSESSMENT
RATE HOW BOTHERED YOU ARE BY EFFECTS OF YOUR SKIN PROBLEMS ON YOUR ACTIVITIES (EG, GOING OUT, ACCOMPLISHING WHAT YOU WANT, WORK ACTIVITIES OR YOUR RELATIONSHIPS WITH OTHERS): 0 - NEVER BOTHERED
DATE THE QUALITY-OF-LIFE ASSESSMENT WAS COMPLETED: 67002
RATE HOW EMOTIONALLY BOTHERED YOU ARE BY YOUR SKIN PROBLEM (FOR EXAMPLE, WORRY, EMBARRASSMENT, FRUSTRATION): 0 - NEVER BOTHERED
ARE THERE EXCLUSIONS OR EXCEPTIONS FOR THE QUALITY OF LIFE ASSESSMENT: NO
RATE HOW BOTHERED YOU ARE BY SYMPTOMS OF YOUR SKIN PROBLEM (EG, ITCHING, STINGING BURNING, HURTING OR SKIN IRRITATION): 0 - NEVER BOTHERED

## 2024-06-11 ASSESSMENT — DERMATOLOGY PATIENT ASSESSMENT
HAVE YOU HAD OR DO YOU HAVE VASCULAR DISEASE: NO
DO YOU HAVE IRREGULAR MENSTRUAL CYCLES: NO
HAVE YOU HAD OR DO YOU HAVE A STAPH INFECTION: NO
ARE YOU AN ORGAN TRANSPLANT RECIPIENT: NO
DO YOU HAVE ANY NEW OR CHANGING LESIONS: NO
DO YOU USE SUNSCREEN: DAILY
ARE YOU TRYING TO GET PREGNANT: NO
ARE YOU ON BIRTH CONTROL: NO
DO YOU USE A TANNING BED: NO

## 2024-06-11 ASSESSMENT — ITCH NUMERIC RATING SCALE: HOW SEVERE IS YOUR ITCHING?: 0

## 2024-06-11 ASSESSMENT — PATIENT GLOBAL ASSESSMENT (PGA): PATIENT GLOBAL ASSESSMENT: PATIENT GLOBAL ASSESSMENT:  1 - CLEAR

## 2024-06-11 NOTE — PROGRESS NOTES
Subjective     Xiomara Rader is a 79 y.o. female who presents for the following: Skin Check (FBSE, no areas of concern, hx of melanoma).     Review of Systems:  No other skin or systemic complaints other than what is documented elsewhere in the note.    The following portions of the chart were reviewed this encounter and updated as appropriate:  Tobacco  Allergies  Meds  Problems  Med Hx  Surg Hx         Skin Cancer History  No skin cancer on file.      Specialty Problems          Dermatology Problems    Melanoma in situ of right upper limb, including shoulder (Multi)     Melanoma 1: Melanoma in situYear Diagnosed:  2023. January.Location:  Right Dorsal Forearm.Treatment(s):  Mohs 4-4-23              Objective   Well appearing patient in no apparent distress; mood and affect are within normal limits.    A full examination was performed including scalp, head, eyes, ears, nose, lips, neck, chest, axillae, abdomen, back, buttocks, bilateral upper extremities, bilateral lower extremities, hands, feet, fingers, toes, fingernails, and toenails. All findings within normal limits unless otherwise noted below.    Assessment/Plan   1. Multiple benign nevi  Brown and tan macules and papules with reassuring findings on dermoscopy    -These lesions have benign, reassuring patterns on dermoscopy  -Recommend continued self observation, and to contact the office if any changes in nevi are noticed    2. Personal history of malignant melanoma of skin  Lymph node basins palpated in relevant regions without appreciable adenopathy; regions include the neck and/or supraclavicular and/or axillary and/or inguinal and/or popliteal skin.    Personal History of Malignant Melanoma  -Well healed scar(s) with no evidence of recurrence  -Discussed the need for regular full skin examinations in the office, and monthly self examinations at home  -Discussed the need for annual ophthalmology exams with dilation  -Discussed concerning lesions  and when to return sooner if any changes noted in skin lesions. Patient verbalizes understanding.    Related Procedures  Follow Up In Dermatology - Established Patient    3. Lentigo  Tan macules    -Benign appearing on exam  -Reassurance, recommend observation    4. Seborrheic keratosis  Stuck on, waxy macule(s)/papule(s)/plaque(s) with comedo-like openings and milia like cysts    -Discussed the nature of the diagnosis  -Reassurance, recommend continued observation    -larger under right breast, discussed treatment with liquid nitrogen defers at this time    5. Actinic skin damage  Background of photodamage with hyper- and hypo-pigmented macules on the skin    6. Screening exam for skin cancer  As part of a routine Full Skin Exam, a genital examination with the presence of a chaperone was offered. The patient agreed to the exam and declined the chaperone.       Full body skin exam  -No lesions concerning for malignancy on the remainder the skin exam today   - The ugly duckling sign was discussed. Monitor for any skin lesions that are different in color, shape, or size than others on body  -Sun protection was discussed. Recommend SPF 30+, hats with brims, sun protective clothing, and avoiding sun exposure between 10 AM and 2 PM whenever possible  -Recommend regular skin exams or sooner if new or changing lesions       Related Procedures  Follow Up In Dermatology - Established Patient        Follow up 1 year Full Skin Exam

## 2024-06-13 DIAGNOSIS — E78.49 OTHER HYPERLIPIDEMIA: ICD-10-CM

## 2024-06-13 DIAGNOSIS — I10 HYPERTENSION, UNSPECIFIED TYPE: ICD-10-CM

## 2024-06-13 RX ORDER — AMLODIPINE BESYLATE 5 MG/1
5 TABLET ORAL DAILY
Qty: 90 TABLET | Refills: 0 | OUTPATIENT
Start: 2024-06-13

## 2024-06-13 RX ORDER — AMLODIPINE BESYLATE 2.5 MG/1
2.5 TABLET ORAL DAILY
Qty: 90 TABLET | Refills: 0 | Status: SHIPPED | OUTPATIENT
Start: 2024-06-13

## 2024-06-13 RX ORDER — AMLODIPINE BESYLATE 5 MG/1
5 TABLET ORAL DAILY
Qty: 90 TABLET | Refills: 0 | Status: SHIPPED | OUTPATIENT
Start: 2024-06-13

## 2024-06-13 RX ORDER — AMLODIPINE BESYLATE 2.5 MG/1
2.5 TABLET ORAL DAILY
Qty: 90 TABLET | Refills: 0 | OUTPATIENT
Start: 2024-06-13

## 2024-06-13 RX ORDER — ROSUVASTATIN CALCIUM 5 MG/1
5 TABLET, COATED ORAL NIGHTLY
Qty: 90 TABLET | Refills: 2 | Status: SHIPPED | OUTPATIENT
Start: 2024-06-13

## 2024-06-24 ENCOUNTER — OFFICE VISIT (OUTPATIENT)
Dept: PRIMARY CARE | Facility: CLINIC | Age: 79
End: 2024-06-24
Payer: MEDICARE

## 2024-06-24 ENCOUNTER — TELEPHONE (OUTPATIENT)
Dept: PRIMARY CARE | Facility: CLINIC | Age: 79
End: 2024-06-24
Payer: MEDICARE

## 2024-06-24 VITALS
HEART RATE: 90 BPM | BODY MASS INDEX: 24.04 KG/M2 | DIASTOLIC BLOOD PRESSURE: 82 MMHG | WEIGHT: 139.6 LBS | SYSTOLIC BLOOD PRESSURE: 132 MMHG | OXYGEN SATURATION: 93 %

## 2024-06-24 DIAGNOSIS — R21 RASH: Primary | ICD-10-CM

## 2024-06-24 DIAGNOSIS — Z85.820 HISTORY OF MELANOMA: ICD-10-CM

## 2024-06-24 PROCEDURE — 1160F RVW MEDS BY RX/DR IN RCRD: CPT | Performed by: SPECIALIST

## 2024-06-24 PROCEDURE — 3079F DIAST BP 80-89 MM HG: CPT | Performed by: SPECIALIST

## 2024-06-24 PROCEDURE — 3075F SYST BP GE 130 - 139MM HG: CPT | Performed by: SPECIALIST

## 2024-06-24 PROCEDURE — 1159F MED LIST DOCD IN RCRD: CPT | Performed by: SPECIALIST

## 2024-06-24 PROCEDURE — 99213 OFFICE O/P EST LOW 20 MIN: CPT | Performed by: SPECIALIST

## 2024-06-24 RX ORDER — MOMETASONE FUROATE 1 MG/G
CREAM TOPICAL DAILY
Qty: 15 G | Refills: 0 | Status: SHIPPED | OUTPATIENT
Start: 2024-06-24 | End: 2024-07-04

## 2024-06-24 NOTE — PROGRESS NOTES
Subjective   Patient ID: Xiomara Rader is a 79 y.o. female who presents for Rash (Breasts).  HPI  80 yo female Pmhx sig for Anxiety/Depression, PMR, Melanoma (Stage 0 right arm) Fam Hx of Melanoma here for rash    Started using lavender dusting powder about a month ago, had itching first week, stopped using and it resolved when she stopped using it.  Rash  under both breasts x 2 weeks some on top and some on side.  Present x 2 wks comes/goes itches and can be redder     Going to [facility] this Saturday to stay with friend    No new bras or detergents or soaps      Also notices chills once in a while with goose bumps  Started Aquaphor (has 1% hydrocortisone    Is using claritin cough not as frequent but still has some congestion and brings up whitish    Was gardening but no known poison ivy     Cough better since on allergy med   Decreased freuency of cough  No longer has tickle in throat    Allergies   Allergen Reactions    Acesulfame Unknown    Clindamycin Rash    Penicillins Rash    Sulfa (Sulfonamide Antibiotics) Rash      Current Outpatient Medications   Medication Instructions    amLODIPine (NORVASC) 2.5 mg, oral, Daily, Take along with 5mg    amLODIPine (NORVASC) 5 mg, oral, Daily, Take along with 2.5mg    melatonin 3 mg capsule 1 tablet, oral, Nightly PRN    mometasone (Elocon) 0.1 % cream Topical, Daily    rosuvastatin (CRESTOR) 5 mg, oral, Nightly        Review of Systems  Constitutional  Some (turned 79) fatigue, no fevers,   HEENT:  No headaches, no dizziness,   Cardiovascular:  No chest pain, no palpitations,  Respiratory:  Decreased cough, no hemoptysis, no wheezing,    Physical Exam  /82 (BP Location: Left arm, Patient Position: Sitting, BP Cuff Size: Adult)   Pulse 90   Wt 63.3 kg (139 lb 9.6 oz) Comment: with shoes  SpO2 93%   BMI 24.04 kg/m²   General:    Well-appearing  F in no acute distress, well nourished, well hydrated  Head:  Normocephalic, atraumatic  Skin:          Warm dry,  erythematous macular patches of erythema underneath right greater than left breast and few erytheamtous areas right anterior breast and one or two area lateral left breast, no irregularly marginated erythema under breasts  Eyes:  Anicteric sclera, pupils equal,   Oral:      Not examined due to pandemic  Cor:      Regular rate, normal S1, S2, no murmurs appreciated, no S3, no S4   Lungs:   Clear to auscultation b/l, no wheezes, no rhonchi, no crackles, no accessory respiratory muscle use    Assessment/Plan   Problem List Items Addressed This Visit       Rash - Primary     Present x 2 weeks, comes/goes, pruritic  Continue loratadine 10 mg twice daily and Pepcid twice daily  Ordered Topical Elocon Cream  Discussed, consider higher dose PO steroid if rash worsens or if topical steroid fails to relieve symptoms               History of melanoma     Continues annual skin checks with dermatology  LOV 6/2024              Elli Quevedo DO

## 2024-06-24 NOTE — TELEPHONE ENCOUNTER
Patient states that she has a rash on both of her breast that has spread over the top of breast and under the arms. The rash itches like crazy it is a flat rash that is pail pink and is blotchy under right breat more so than left breast. Has used hydrocortisone over the counter would like something stronger to stop the itching. The patients pharmacy is [facility] #[id number] - [place], [place] - [address] AT [facility] [phone]

## 2024-07-08 PROBLEM — Z85.820 HISTORY OF MELANOMA: Status: ACTIVE | Noted: 2024-07-08

## 2024-07-08 PROBLEM — R21 RASH: Status: ACTIVE | Noted: 2024-07-08

## 2024-07-08 NOTE — ASSESSMENT & PLAN NOTE
Present x 2 weeks, comes/goes, pruritic  Continue loratadine 10 mg twice daily and Pepcid twice daily  Ordered Topical Elocon Cream  Discussed, consider higher dose PO steroid if rash worsens or if topical steroid fails to relieve symptoms

## 2024-08-14 ENCOUNTER — APPOINTMENT (OUTPATIENT)
Dept: PRIMARY CARE | Facility: CLINIC | Age: 79
End: 2024-08-14
Payer: MEDICARE

## 2024-08-14 VITALS
WEIGHT: 138.6 LBS | OXYGEN SATURATION: 94 % | DIASTOLIC BLOOD PRESSURE: 70 MMHG | SYSTOLIC BLOOD PRESSURE: 112 MMHG | BODY MASS INDEX: 23.87 KG/M2 | HEART RATE: 78 BPM

## 2024-08-14 DIAGNOSIS — J30.2 ACUTE SEASONAL ALLERGIC RHINITIS: ICD-10-CM

## 2024-08-14 DIAGNOSIS — E78.5 HYPERLIPIDEMIA, UNSPECIFIED HYPERLIPIDEMIA TYPE: ICD-10-CM

## 2024-08-14 DIAGNOSIS — Z12.31 ENCOUNTER FOR SCREENING MAMMOGRAM FOR MALIGNANT NEOPLASM OF BREAST: ICD-10-CM

## 2024-08-14 DIAGNOSIS — I35.1 AORTIC VALVE INSUFFICIENCY, ETIOLOGY OF CARDIAC VALVE DISEASE UNSPECIFIED: ICD-10-CM

## 2024-08-14 DIAGNOSIS — Z00.00 ENCOUNTER FOR SUBSEQUENT ANNUAL WELLNESS VISIT IN MEDICARE PATIENT: Primary | ICD-10-CM

## 2024-08-14 DIAGNOSIS — R53.83 FATIGUE, UNSPECIFIED TYPE: ICD-10-CM

## 2024-08-14 DIAGNOSIS — Z87.39 HISTORY OF POLYMYALGIA RHEUMATICA: ICD-10-CM

## 2024-08-14 DIAGNOSIS — Z85.820 HISTORY OF MELANOMA: ICD-10-CM

## 2024-08-14 DIAGNOSIS — I10 PRIMARY HYPERTENSION: ICD-10-CM

## 2024-08-14 DIAGNOSIS — K64.9 HEMORRHOIDS, UNSPECIFIED HEMORRHOID TYPE: ICD-10-CM

## 2024-08-14 PROCEDURE — 1170F FXNL STATUS ASSESSED: CPT | Performed by: SPECIALIST

## 2024-08-14 PROCEDURE — 3074F SYST BP LT 130 MM HG: CPT | Performed by: SPECIALIST

## 2024-08-14 PROCEDURE — 3078F DIAST BP <80 MM HG: CPT | Performed by: SPECIALIST

## 2024-08-14 PROCEDURE — G0439 PPPS, SUBSEQ VISIT: HCPCS | Performed by: SPECIALIST

## 2024-08-14 PROCEDURE — 1160F RVW MEDS BY RX/DR IN RCRD: CPT | Performed by: SPECIALIST

## 2024-08-14 PROCEDURE — 1158F ADVNC CARE PLAN TLK DOCD: CPT | Performed by: SPECIALIST

## 2024-08-14 PROCEDURE — 1159F MED LIST DOCD IN RCRD: CPT | Performed by: SPECIALIST

## 2024-08-14 PROCEDURE — 99397 PER PM REEVAL EST PAT 65+ YR: CPT | Performed by: SPECIALIST

## 2024-08-14 PROCEDURE — 1123F ACP DISCUSS/DSCN MKR DOCD: CPT | Performed by: SPECIALIST

## 2024-08-14 PROCEDURE — 1036F TOBACCO NON-USER: CPT | Performed by: SPECIALIST

## 2024-08-14 RX ORDER — LORATADINE 10 MG/1
10 TABLET ORAL DAILY
COMMUNITY

## 2024-08-14 ASSESSMENT — ACTIVITIES OF DAILY LIVING (ADL)
DRESSING: INDEPENDENT
BATHING: INDEPENDENT
DOING_HOUSEWORK: INDEPENDENT
TAKING_MEDICATION: INDEPENDENT
GROCERY_SHOPPING: INDEPENDENT
MANAGING_FINANCES: INDEPENDENT

## 2024-08-14 ASSESSMENT — PATIENT HEALTH QUESTIONNAIRE - PHQ9
2. FEELING DOWN, DEPRESSED OR HOPELESS: NOT AT ALL
SUM OF ALL RESPONSES TO PHQ9 QUESTIONS 1 AND 2: 0
1. LITTLE INTEREST OR PLEASURE IN DOING THINGS: NOT AT ALL

## 2024-08-14 NOTE — PROGRESS NOTES
Subjective   Patient ID: Xiomara Rader is a 79 y.o. female who presents for Follow-up.  HPI    78 yo female Pmhx sig for Anxiety/Depression, PMR, Melanoma (Stage 0 right arm) Fam Hx of Melanoma  presents for MAW    Rash from [place] bra so got rid of them    Calcium with D 500 mg need to add to med list    Off prednisone in June now aches mainly shoulders at times hands  some times feet sometimes knees left knee replacement doesn't last more than 30 mins in morning    Does floor exercises for her back    Lost cat   Getting granddaughter's cat    Has varicose veins lower extremities  C/o of hemorrhoids sometimes unable to prevent passing gas.  Discussed Colorectal CNP   Discussed and opted against trial of Anusol HC suppositories    Has living will health care POTONJA SUN is son López Rader     Goals of care:  Treat treatable conditions, but would not want to prolong act of dying through extraordinary means if no hope for meaningful recovery.      Allergies   Allergen Reactions    Acesulfame Unknown    Clindamycin Rash    Penicillins Rash    Sulfa (Sulfonamide Antibiotics) Rash      Current Outpatient Medications   Medication Instructions    amLODIPine (NORVASC) 2.5 mg, oral, Daily, Take along with 5mg    amLODIPine (NORVASC) 5 mg, oral, Daily, Take along with 2.5mg    loratadine (CLARITIN) 10 mg, oral, Daily    melatonin 3 mg capsule 1 tablet, oral, Nightly PRN    mometasone (Elocon) 0.1 % cream Topical, Daily    rosuvastatin (CRESTOR) 5 mg, oral, Nightly        Review of Systems  Constitutional  No fatigue, no fevers, no chills, Some weight loss when she went off of Prednisone end of June (weight was 142 now 138)  HEENT:  Positive Occipital headaches that come/go went months without and then back in past 2 weeks, come/go like a wave, resolves with tylenol or ibuprofen, no dizziness, no double vision, no blurred vision, no hearing loss  Cardiovascular:  No chest pain, no palpitations, no shortness of breath with exertion  (one flight of stairs),   Respiratory:  Positive (on Claritin) cough, no hemoptysis, no wheezing, No shortness of breath at rest  GI:  No dysphagia, no odynophagia, no reflux, no abdominal pain, no nausea, no vomiting, no changes in bowel habits, no bright red blood per rectum, no melena has hemorrhoids  :  No urinary frequency, no dysuria, no urine incontinence  MSK:  No falls, + (see HPI) joint pain, no joint swelling  Neuro:  No tremors, no extremity weakness, no changes in sensation    Physical Exam  /66 (BP Location: Left arm, Patient Position: Sitting, BP Cuff Size: Adult)   Pulse 78   Wt 62.9 kg (138 lb 9.6 oz) Comment: with shoes  SpO2 94%   BMI 23.87 kg/m²   112/70  General:    Well-appearing  F in no acute distress, well nourished, well hydrated  Head:  Normocephalic, atraumatic  Skin:          Warm dry,   Eyes:  Anicteric sclera, pupils equal,   Ears:        TMs obscured by cerumen  Oral:      Not examined due to pandemic  Neck:   Supple, no cervical/supraclavicular adenopathy, no thyromegaly or nodules appreciated on exam  Cor:      Regular rate, normal S1, S2, no murmurs appreciated, no S3, no S4   Lungs:   Clear to auscultation b/l, no wheezes, no rhonchi, no crackles, no accessory respiratory muscle use  Abd:          Soft, nontender, no guarding, no rebound, no hepatosplenomegaly appreciated   Ext:            No lower extremity edema, no palpable cords  Pulses:      Pedal pulses intact  Neuro:   CN2-12 grossly intact (except funduscopic exam not performed)  Breasts:     declined    Assessment/Plan   Problem List Items Addressed This Visit    None  MAW  -Plans influenza vac this fall  -Covid vaccine at [facility] on 7/30/2024, plans repeat this fall when eligible  -Prior RSV vaccine 9/1/2023  -Prior Shingrix vaccines  -Prior Prevnar 13 10/16/2019 and PPSV 1/1/2013  -Prior Tdap 10/13/2021 repeat 10 yrs  -Negative hep C antibody 11/14/19  -Mammogram 8/24/2023 ordered  -DXA 4/10/2023 normal, has  open order from Rheum  -Walks 30 mins daily  -Labs ordered CMP CBC Fx Lipids Lp(a)    Screen breast cancer  -Mammogram 8/24/2023 ordered    Dense breast tissue on mammogram  -Discussed dense breast tissue may decrease the sensitivity of the mammogram  -Discussed  offers self pay FAST MRI for women with dense breast tissue    Allergies  -On Claritin, will add flonase   -Cough resolved once she started taking Claritin    Htn  -Well controlled on current medication    Hyperlipid  -Continue rosuvastatin  -LDL 58 last yr  -Labs ordered CMP Fx Lipids Lp(a)   -Discussed, ordered CT Cardiac Score    Mild aortic regur 2018, Fatigue  -Sister and Mother had CHF (updated fam hx)  -Wants cardiology eval  -Refer to Cardio     Hx of Melanoma  -Management per derm  -Continue annual dermatology evals  -Malignant melanoma in situ 1/19/2023    Hemorrhoids  -Discussed colorectal eval for banding  -Referred to Colorectal Veronica Snow, CNP    Occipital HA  -Neuro exam nonfocal  -Doubt occipital neuralgia, ?cervicogenic?   -Relieved with tylenol or Ibuprofen  -To let me know if sx worsen    Hx of PMR and PRM relapse  -Off Prednisone 5/2024  -Management per Rheum, follows annually       Elli Quevedo, DO

## 2024-08-14 NOTE — PATIENT INSTRUCTIONS
Recommend annual influenza vaccine  Recommend Covid vaccine in fall (3 mos after lats Covid vaccine)  Recommend pneumonia vaccine (either Pneumovax or Prevnar 20)

## 2024-08-17 DIAGNOSIS — U07.1 COVID-19: Primary | ICD-10-CM

## 2024-08-17 NOTE — PROGRESS NOTES
Acute COVID 19 infection  Symptoms started on Thursday, 8/15/2024 with mild sore throat  Negative testing on that date   Patient seen in office for visit on 8/14/2024  Awoke with lack of taste, repeat testing in AM on 8/17/2024 positive for COVID  Given symptom onset within ~48 hours, advanced age, advise Paxlovid  Patient agreeable  Last renal function from 2023 reviewed and WNL   Patient on Rosuvastatin; will HOLD while on Paxlovid   Emergency precautions discussed.     Shashank Ortiz MD

## 2024-08-20 PROBLEM — K64.9 HEMORRHOIDS: Status: ACTIVE | Noted: 2024-08-20

## 2024-08-20 PROBLEM — Z87.39 HISTORY OF POLYMYALGIA RHEUMATICA: Status: ACTIVE | Noted: 2024-08-20

## 2024-08-20 PROBLEM — I35.1 AORTIC VALVE REGURGITATION: Status: ACTIVE | Noted: 2024-08-20

## 2024-08-20 PROBLEM — D03.61 MELANOMA IN SITU OF RIGHT UPPER LIMB, INCLUDING SHOULDER (MULTI): Status: RESOLVED | Noted: 2023-06-22 | Resolved: 2024-08-20

## 2024-08-26 ENCOUNTER — LAB (OUTPATIENT)
Dept: LAB | Facility: LAB | Age: 79
End: 2024-08-26
Payer: MEDICARE

## 2024-08-26 DIAGNOSIS — I10 PRIMARY HYPERTENSION: ICD-10-CM

## 2024-08-26 DIAGNOSIS — E78.5 HYPERLIPIDEMIA, UNSPECIFIED HYPERLIPIDEMIA TYPE: ICD-10-CM

## 2024-08-26 DIAGNOSIS — R53.83 FATIGUE, UNSPECIFIED TYPE: ICD-10-CM

## 2024-08-26 LAB
ALBUMIN SERPL BCP-MCNC: 3.9 G/DL (ref 3.4–5)
ALP SERPL-CCNC: 98 U/L (ref 33–136)
ALT SERPL W P-5'-P-CCNC: 19 U/L (ref 7–45)
ANION GAP SERPL CALC-SCNC: 13 MMOL/L (ref 10–20)
AST SERPL W P-5'-P-CCNC: 17 U/L (ref 9–39)
BILIRUB SERPL-MCNC: 0.4 MG/DL (ref 0–1.2)
BUN SERPL-MCNC: 17 MG/DL (ref 6–23)
CALCIUM SERPL-MCNC: 9.3 MG/DL (ref 8.6–10.6)
CHLORIDE SERPL-SCNC: 106 MMOL/L (ref 98–107)
CHOLEST SERPL-MCNC: 218 MG/DL (ref 0–199)
CHOLESTEROL/HDL RATIO: 3.3
CO2 SERPL-SCNC: 26 MMOL/L (ref 21–32)
CREAT SERPL-MCNC: 0.79 MG/DL (ref 0.5–1.05)
EGFRCR SERPLBLD CKD-EPI 2021: 77 ML/MIN/1.73M*2
ERYTHROCYTE [DISTWIDTH] IN BLOOD BY AUTOMATED COUNT: 12.7 % (ref 11.5–14.5)
GLUCOSE SERPL-MCNC: 95 MG/DL (ref 74–99)
HCT VFR BLD AUTO: 40.5 % (ref 36–46)
HDLC SERPL-MCNC: 66.6 MG/DL
HGB BLD-MCNC: 13 G/DL (ref 12–16)
LDLC SERPL CALC-MCNC: 117 MG/DL
MCH RBC QN AUTO: 30.5 PG (ref 26–34)
MCHC RBC AUTO-ENTMCNC: 32.1 G/DL (ref 32–36)
MCV RBC AUTO: 95 FL (ref 80–100)
NON HDL CHOLESTEROL: 151 MG/DL (ref 0–149)
NRBC BLD-RTO: 0 /100 WBCS (ref 0–0)
PLATELET # BLD AUTO: 315 X10*3/UL (ref 150–450)
POTASSIUM SERPL-SCNC: 4 MMOL/L (ref 3.5–5.3)
PROT SERPL-MCNC: 6.5 G/DL (ref 6.4–8.2)
RBC # BLD AUTO: 4.26 X10*6/UL (ref 4–5.2)
SODIUM SERPL-SCNC: 141 MMOL/L (ref 136–145)
T4 FREE SERPL-MCNC: 1.13 NG/DL (ref 0.78–1.48)
TRIGL SERPL-MCNC: 171 MG/DL (ref 0–149)
TSH SERPL-ACNC: 4.1 MIU/L (ref 0.44–3.98)
VLDL: 34 MG/DL (ref 0–40)
WBC # BLD AUTO: 6.5 X10*3/UL (ref 4.4–11.3)

## 2024-08-26 PROCEDURE — 84439 ASSAY OF FREE THYROXINE: CPT

## 2024-08-26 PROCEDURE — 80061 LIPID PANEL: CPT

## 2024-08-26 PROCEDURE — 84443 ASSAY THYROID STIM HORMONE: CPT

## 2024-08-26 PROCEDURE — 85027 COMPLETE CBC AUTOMATED: CPT

## 2024-08-26 PROCEDURE — 36415 COLL VENOUS BLD VENIPUNCTURE: CPT

## 2024-08-26 PROCEDURE — 82172 ASSAY OF APOLIPOPROTEIN: CPT

## 2024-08-26 PROCEDURE — 80053 COMPREHEN METABOLIC PANEL: CPT

## 2024-08-28 LAB — LPA SERPL-MCNC: 26 MG/DL

## 2024-09-05 ENCOUNTER — HOSPITAL ENCOUNTER (OUTPATIENT)
Dept: RADIOLOGY | Facility: CLINIC | Age: 79
Discharge: HOME | End: 2024-09-05
Payer: MEDICARE

## 2024-09-05 VITALS — BODY MASS INDEX: 24.45 KG/M2 | HEIGHT: 63 IN | WEIGHT: 138 LBS

## 2024-09-05 DIAGNOSIS — Z12.31 ENCOUNTER FOR SCREENING MAMMOGRAM FOR MALIGNANT NEOPLASM OF BREAST: ICD-10-CM

## 2024-09-05 PROCEDURE — 77063 BREAST TOMOSYNTHESIS BI: CPT | Performed by: RADIOLOGY

## 2024-09-05 PROCEDURE — 77067 SCR MAMMO BI INCL CAD: CPT

## 2024-09-05 PROCEDURE — 77067 SCR MAMMO BI INCL CAD: CPT | Performed by: RADIOLOGY

## 2024-09-13 DIAGNOSIS — I10 HYPERTENSION, UNSPECIFIED TYPE: ICD-10-CM

## 2024-09-15 RX ORDER — AMLODIPINE BESYLATE 2.5 MG/1
2.5 TABLET ORAL DAILY
Qty: 90 TABLET | Refills: 2 | Status: SHIPPED | OUTPATIENT
Start: 2024-09-15

## 2024-09-16 RX ORDER — AMLODIPINE BESYLATE 5 MG/1
5 TABLET ORAL DAILY
Qty: 90 TABLET | Refills: 0 | OUTPATIENT
Start: 2024-09-16

## 2024-09-18 ENCOUNTER — HOSPITAL ENCOUNTER (OUTPATIENT)
Dept: RADIOLOGY | Facility: CLINIC | Age: 79
Discharge: HOME | End: 2024-09-18
Payer: MEDICARE

## 2024-09-18 DIAGNOSIS — E78.5 HYPERLIPIDEMIA, UNSPECIFIED HYPERLIPIDEMIA TYPE: ICD-10-CM

## 2024-09-18 PROCEDURE — 75571 CT HRT W/O DYE W/CA TEST: CPT

## 2024-09-26 DIAGNOSIS — R79.89 ABNORMAL THYROID BLOOD TEST: Primary | ICD-10-CM

## 2024-09-26 DIAGNOSIS — E78.5 HYPERLIPIDEMIA, UNSPECIFIED HYPERLIPIDEMIA TYPE: ICD-10-CM

## 2024-09-26 DIAGNOSIS — R79.89 ELEVATED SERUM FREE T4 LEVEL: ICD-10-CM

## 2024-09-26 DIAGNOSIS — E03.8 SUBCLINICAL HYPOTHYROIDISM: ICD-10-CM

## 2024-10-07 DIAGNOSIS — I10 HYPERTENSION, UNSPECIFIED TYPE: ICD-10-CM

## 2024-10-07 RX ORDER — AMLODIPINE BESYLATE 5 MG/1
5 TABLET ORAL DAILY
Qty: 90 TABLET | Refills: 2 | Status: SHIPPED | OUTPATIENT
Start: 2024-10-07

## 2024-10-09 NOTE — PROGRESS NOTES
Primary Care Physician: Elli Quevedo DO  Date of Visit: 10/11/2024  8:40 AM EDT      Chief Complaint:     Pt referred by Dr. Gonzalez for HPL     HPI / Summary:   Xiomara Rader is a 79 y.o. female with HTN, dyslipidemia here today she wanted further cardiac evaluation.  Says she has a sister with heart attack and recent stents.  Niece  of cardiac arrhythmia in her 30s and mother with CHF.    She denies any chest pain or pressure  No shortness of breath  Some occasional lightheadedness when standing still for prolonged periods of time.  As soon as she starts walking again feels fine.  No syncope.    Reports myalgias with rosuvastatin 5 mg.  She remembers needing to stop this medication when had COVID and taking Paxlovid and all her myalgias went away.  Recent underwent coronary calcium score as below with is 0      Last Cardiology Tests:  ECG:   10/11/2024: NSR, normal ECG  2021: NSR with HR 88 bpm, normal ECG  Echo:   18  Normal LVEF, mild aortic regurgitation    Cath:      Stress Test:    Cardiac Imaging: Calcium Scoring 24  1. Coronary artery calcium score of 0, ascending aorta 3.7 cm          Past Medical History:  Past Medical History:   Diagnosis Date    Arthritis     Headache 2023    Personal history of other diseases of the musculoskeletal system and connective tissue 2022    History of polymyalgia rheumatica    Personal history of other mental and behavioral disorders 2015    History of depression        Past Surgical History:  Past Surgical History:   Procedure Laterality Date    APPENDECTOMY  2015    Appendectomy    CATARACT EXTRACTION  2016    Cataract Surgery    COLONOSCOPY  2015    Complete Colonoscopy    MALIGNANT SKIN LESION EXCISION      stage 0 right arm melanoma 2023    OTHER SURGICAL HISTORY  10/16/2019    Rectocele repair    TONSILLECTOMY  2015    Tonsillectomy    TOTAL VAGINAL HYSTERECTOMY  2015    Vaginal Hysterectomy           Social History:  She reports that she quit smoking about 59 years ago. Her smoking use included cigarettes. She started smoking about 60 years ago. She has a 0.3 pack-year smoking history. She has never used smokeless tobacco. She reports current alcohol use of about 1.0 standard drink of alcohol per week. She reports that she does not use drugs.    Family History:  family history includes Breast cancer in her mother's sister; Heart failure in her mother and sister; Melanoma in her son; Ovarian cancer in her mother.      Allergies:  Allergies   Allergen Reactions    Acesulfame Unknown    Clindamycin Rash    Penicillins Rash    Sulfa (Sulfonamide Antibiotics) Rash       Outpatient Medications:  Current Outpatient Medications   Medication Instructions    amLODIPine (NORVASC) 2.5 mg, oral, Daily, Take along with 5mg    amLODIPine (NORVASC) 5 mg, oral, Daily, Take along with 2.5mg    loratadine (CLARITIN) 10 mg, oral, Daily    melatonin 3 mg capsule 1 tablet, oral, Nightly PRN    rosuvastatin (CRESTOR) 5 mg, oral, Nightly       Review of Systems:  A complete 10 point ROS was performed and is negative except for HPI    Physical Exam:  GENERAL: alert, cooperative, pleasant, in no acute distress  SKIN: warm, dry, no rash.  NECK: no JVD, no SANDOR  CARDIAC: Regular rate and rhythm with no rubs, murmurs, or gallops  CHEST: Normal respiratory efforts, lungs clear to auscultation bilaterally.  ABDOMEN: soft, nontender, nondistended  EXTREMITIES: no edema  NEURO: Alert and oriented x 3.  Grossly normal.  Moves all 4 extremities.    Vitals:    10/11/24 0846   BP: 112/75   BP Location: Left arm   Pulse: 93   SpO2: 96%   Weight: 61.2 kg (135 lb)     Wt Readings from Last 5 Encounters:   09/05/24 62.6 kg (138 lb)   08/14/24 62.9 kg (138 lb 9.6 oz)   06/24/24 63.3 kg (139 lb 9.6 oz)   06/10/24 63.8 kg (140 lb 9.6 oz)   08/21/23 64.5 kg (142 lb 3.2 oz)     Body mass index is 23.91 kg/m².        Last Labs:  CMP:  Recent Labs      "08/26/24  0737 08/23/23  0751 02/27/23  1533    141 139   K 4.0 4.5 4.3    107 102   CO2 26 29 21   ANIONGAP 13 10 20   BUN 17 16 22   CREATININE 0.79 0.89 0.98   EGFR 77  --   --    GLUCOSE 95 86 91     Recent Labs     08/26/24  0737 08/23/23  0751 02/27/23  1533   ALBUMIN 3.9 4.1 4.3   ALKPHOS 98 84 87   ALT 19 18 19   AST 17 18 21   BILITOT 0.4 0.6 0.4     CBC:  Recent Labs     08/26/24  0737 08/23/23  0751 02/27/23  1533   WBC 6.5 6.3 7.6   HGB 13.0 14.1 13.5   HCT 40.5 43.5 41.9    288 304   MCV 95 99 95     COAG:   Recent Labs     11/25/21  0505 11/24/21  0912   INR 1.0 1.0     ENDO:  Recent Labs     08/26/24  0737 12/22/21  0959   TSH 4.10* 1.81      CARDIAC: No results for input(s): \"CKMB\", \"TROPHS\", \"BNP\" in the last 61832 hours.    No lab exists for component: \"CK\", \"CKMBP\"  Recent Labs     08/26/24  0737 08/23/23  0751 11/09/21  0705   CHOL 218* 153 210*   LDLF  --  58 113*   LDLCALC 117*  --   --    HDL 66.6 81.1 71.9   TRIG 171* 70 128       Component      Latest Ref Rng 8/26/2024   Lipoprotein (a)      <=29 mg/dL 26        The 10-year ASCVD risk score (Georgi DK, et al., 2019) is: 22.9%    Values used to calculate the score:      Age: 79 years      Sex: Female      Is Non- : No      Diabetic: No      Tobacco smoker: No      Systolic Blood Pressure: 112 mmHg      Is BP treated: Yes      HDL Cholesterol: 66.6 mg/dL      Total Cholesterol: 218 mg/dL          Assessment/Plan   79-year-old female with HTN, mild dyslipidemia here for further cardiac risk counseling.  She has no signs or symptoms suggestive of ischemic disease. ECG is normal. Reassuring coronary calcium score of 0 and normal lipoprotein a.  LDL in the 110s off statin.  If we use the ASCVD risk score this is high at 23%, PREVENT score is 20% mostly due to age, but her GARNER risk score is 2.8%  Blood pressure is well-controlled.  Nondiabetic and non-smoker.  Reviewed risks and benefits of future statin " use are probably equivocal.  We reviewed options and she would like to try one more statin.  Favor pravastatin 10 mg. she will contact me in a few weeks to let me know how tolerates it.  If recurrent myalgias with pravastatin, reasonable case can be made for not using statin in the future    Followup Appts:  Future Appointments   Date Time Provider Department Center   10/11/2024  9:30 AM AHU [id number] ECG [id number] [facility]   6/9/2025 10:00 AM Teressa Ochoa MD [id number] [facility]   6/11/2025  9:15 AM Caren Chew MD [id number] [facility]           ____________________________________________________________  Fabian Álvarez DO  [facility]  [facility]

## 2024-10-11 ENCOUNTER — HOSPITAL ENCOUNTER (OUTPATIENT)
Dept: CARDIOLOGY | Facility: HOSPITAL | Age: 79
Discharge: HOME | End: 2024-10-11
Payer: MEDICARE

## 2024-10-11 ENCOUNTER — APPOINTMENT (OUTPATIENT)
Dept: CARDIOLOGY | Facility: CLINIC | Age: 79
End: 2024-10-11
Payer: MEDICARE

## 2024-10-11 ENCOUNTER — OFFICE VISIT (OUTPATIENT)
Dept: CARDIOLOGY | Facility: CLINIC | Age: 79
End: 2024-10-11
Payer: MEDICARE

## 2024-10-11 VITALS
SYSTOLIC BLOOD PRESSURE: 112 MMHG | DIASTOLIC BLOOD PRESSURE: 75 MMHG | WEIGHT: 135 LBS | BODY MASS INDEX: 23.91 KG/M2 | HEART RATE: 93 BPM | OXYGEN SATURATION: 96 %

## 2024-10-11 DIAGNOSIS — E78.5 HYPERLIPIDEMIA, UNSPECIFIED HYPERLIPIDEMIA TYPE: ICD-10-CM

## 2024-10-11 DIAGNOSIS — Z71.89 ENCOUNTER FOR CARDIAC RISK COUNSELING: ICD-10-CM

## 2024-10-11 DIAGNOSIS — E78.49 OTHER HYPERLIPIDEMIA: Primary | ICD-10-CM

## 2024-10-11 DIAGNOSIS — I10 HYPERTENSION, UNSPECIFIED TYPE: ICD-10-CM

## 2024-10-11 PROBLEM — R94.39 ABNORMAL STRESS ECHO: Status: RESOLVED | Noted: 2024-06-10 | Resolved: 2024-10-11

## 2024-10-11 PROCEDURE — 99204 OFFICE O/P NEW MOD 45 MIN: CPT | Performed by: INTERNAL MEDICINE

## 2024-10-11 PROCEDURE — 93005 ELECTROCARDIOGRAM TRACING: CPT

## 2024-10-11 PROCEDURE — 1159F MED LIST DOCD IN RCRD: CPT | Performed by: INTERNAL MEDICINE

## 2024-10-11 PROCEDURE — 3074F SYST BP LT 130 MM HG: CPT | Performed by: INTERNAL MEDICINE

## 2024-10-11 PROCEDURE — 3078F DIAST BP <80 MM HG: CPT | Performed by: INTERNAL MEDICINE

## 2024-10-11 PROCEDURE — 99214 OFFICE O/P EST MOD 30 MIN: CPT | Performed by: INTERNAL MEDICINE

## 2024-10-11 RX ORDER — PRAVASTATIN SODIUM 10 MG/1
10 TABLET ORAL DAILY
Qty: 30 TABLET | Refills: 2 | Status: SHIPPED | OUTPATIENT
Start: 2024-10-11 | End: 2025-10-11

## 2024-10-14 LAB
ATRIAL RATE: 95 BPM
P AXIS: 62 DEGREES
P OFFSET: 188 MS
P ONSET: 136 MS
PR INTERVAL: 162 MS
Q ONSET: 217 MS
QRS COUNT: 16 BEATS
QRS DURATION: 76 MS
QT INTERVAL: 356 MS
QTC CALCULATION(BAZETT): 447 MS
QTC FREDERICIA: 414 MS
R AXIS: -10 DEGREES
T AXIS: 55 DEGREES
T OFFSET: 395 MS
VENTRICULAR RATE: 95 BPM

## 2024-10-14 PROCEDURE — 93005 ELECTROCARDIOGRAM TRACING: CPT

## 2024-10-16 ENCOUNTER — TELEPHONE (OUTPATIENT)
Dept: PRIMARY CARE | Facility: CLINIC | Age: 79
End: 2024-10-16

## 2024-10-30 ENCOUNTER — TELEPHONE (OUTPATIENT)
Dept: DERMATOLOGY | Facility: CLINIC | Age: 79
End: 2024-10-30
Payer: MEDICARE

## 2024-11-03 DIAGNOSIS — E78.49 OTHER HYPERLIPIDEMIA: ICD-10-CM

## 2024-11-04 NOTE — TELEPHONE ENCOUNTER
REFILL DENIED- patient states she has similar aches and pains on pravastatin that she had on rosuvastatin. States she does not need refill at this time as she will resume the other medication she has at home.

## 2024-11-04 NOTE — TELEPHONE ENCOUNTER
Patient recently started on pravastatin (intolerant to other statins in the past). TCT patient and left [facility] regarding tolerance of current med and if she would like a 90d supply as requested by her pharmacy

## 2024-11-05 RX ORDER — PRAVASTATIN SODIUM 10 MG/1
10 TABLET ORAL DAILY
Qty: 90 TABLET | Refills: 1 | OUTPATIENT
Start: 2024-11-05

## 2024-11-09 ENCOUNTER — APPOINTMENT (OUTPATIENT)
Dept: DERMATOLOGY | Facility: CLINIC | Age: 79
End: 2024-11-09
Payer: MEDICARE

## 2024-11-09 DIAGNOSIS — L81.4 LENTIGO: ICD-10-CM

## 2024-11-09 DIAGNOSIS — L82.1 SEBORRHEIC KERATOSIS: Primary | ICD-10-CM

## 2024-11-09 PROCEDURE — 1160F RVW MEDS BY RX/DR IN RCRD: CPT | Performed by: NURSE PRACTITIONER

## 2024-11-09 PROCEDURE — 1036F TOBACCO NON-USER: CPT | Performed by: NURSE PRACTITIONER

## 2024-11-09 PROCEDURE — 99212 OFFICE O/P EST SF 10 MIN: CPT | Performed by: NURSE PRACTITIONER

## 2024-11-09 PROCEDURE — 1159F MED LIST DOCD IN RCRD: CPT | Performed by: NURSE PRACTITIONER

## 2024-11-09 RX ORDER — PREDNISONE 20 MG/1
40 TABLET ORAL DAILY
COMMUNITY
Start: 2024-10-27 | End: 2024-11-03

## 2024-11-09 NOTE — PROGRESS NOTES
Subjective     Xiomara Rader is a 79 y.o. female who presents for the following: Suspicious Skin Lesion (Located right forearm/Present since summer/Denies pain, itching or bleeding/H/o MM Right arm close to spot).     Review of Systems:  No other skin or systemic complaints other than what is documented elsewhere in the note.    The following portions of the chart were reviewed this encounter and updated as appropriate:  Tobacco  Allergies  Meds  Problems  Med Hx  Surg Hx  Fam Hx       Skin Cancer History  No skin cancer on file.    Specialty Problems          Dermatology Problems    History of melanoma    Rash     Past Medical History:  Xiomara Rader  has a past medical history of Arthritis, Headache (08/16/2023), Personal history of other diseases of the musculoskeletal system and connective tissue (07/05/2022), and Personal history of other mental and behavioral disorders (01/20/2015).    Past Surgical History:  Xiomara Rader  has a past surgical history that includes Tonsillectomy (01/20/2015); Total vaginal hysterectomy (01/20/2015); Appendectomy (01/20/2015); Colonoscopy (01/20/2015); Cataract extraction (11/30/2016); Other surgical history (10/16/2019); and Malignant skin lesion excision.    Family History:  Patient family history includes Breast cancer in her mother's sister; Heart failure in her mother and sister; Melanoma in her son; Ovarian cancer in her mother.    Social History:  Xiomara Rader  reports that she quit smoking about 59 years ago. Her smoking use included cigarettes. She started smoking about 60 years ago. She has a 0.3 pack-year smoking history. She has never used smokeless tobacco. She reports current alcohol use of about 1.0 standard drink of alcohol per week. She reports that she does not use drugs.    Allergies:  Acesulfame, Clindamycin, Penicillins, and Sulfa (sulfonamide antibiotics)    Current Medications / CAM's:    Current Outpatient Medications:     amLODIPine (Norvasc)  2.5 mg tablet, TAKE 1 TABLET (2.5 MG) BY MOUTH ONCE DAILY. TAKE ALONG WITH 5MG, Disp: 90 tablet, Rfl: 2    amLODIPine (Norvasc) 5 mg tablet, TAKE 1 TABLET (5 MG) BY MOUTH ONCE DAILY. TAKE ALONG WITH 2.5MG, Disp: 90 tablet, Rfl: 2    loratadine (Claritin) 10 mg tablet, Take 1 tablet (10 mg) by mouth once daily., Disp: , Rfl:     melatonin 3 mg capsule, Take 1 tablet by mouth as needed at bedtime., Disp: , Rfl:     pravastatin (Pravachol) 10 mg tablet, Take 1 tablet (10 mg) by mouth once daily., Disp: 30 tablet, Rfl: 2     Objective   Well appearing patient in no apparent distress; mood and affect are within normal limits.    A focused skin examination was performed. All findings within normal limits unless otherwise noted below.    Assessment/Plan   1. Seborrheic keratosis  Right Forearm - Posterior  Stuck on verrucous, tan-brown papules and plaques.      Although Seborrheic Keratoses can be troublesome and unsightly, they are entirely benign.  Removal of Seborrheic Keratoses is considered a cosmetic procedure. Removal is typically performed using liquid nitrogen cryotherapy.  Treatment of current lesions does not prevent the development of new Seborrheic Keratoses in the future.    2. Lentigo (2)  Left Arm, Right Arm  Scattered tan macules in sun-exposed areas.    A solar lentigo (plural, solar lentigines), sometimes called an age spot or liver spot, is a brown macule (small, flat, smooth area of skin) caused by chronic sun or artificial ultraviolet (UV) light exposure. There may be just one lentigo or there may be multiple. This type of lentigo is different from lentigo simplex (discussed separately) because it is caused by exposure to UV light. Solar lentigines are benign, but they do indicate excessive sun exposure, a risk factor for the development of skin cancer.  Lesions are benign, no treatment needed.       Follow up as scheudled for a total body skin exam. Please call me if there are any changes or  development of concerning symptoms (lesion/skin condition is changing, bleeding, enlarging, or worsening).

## 2025-04-03 DIAGNOSIS — M72.0 DUPUYTREN CONTRACTURE: Primary | ICD-10-CM

## 2025-04-14 NOTE — PROGRESS NOTES
[facility]  Hand and Upper Extremity Service  Initial evaluation / Consultation         Consult requested by Referring Physician: Dr. Quevedo     Chief Complaint:Right hand          79 y.o left hand dominant female presenting for appearance of an abnormality in her right palm. She did research online and diagnosed herself with Dupuytren's disease. She indicates she is unaware of any of her siblings or parents having similar issues but her son does apparently have the same type of problem. Her primary concern is of pain over one of the nodules in her palm, particularly with gripping things but she has no motion abnormalities.          Please refer to New Patient Intake Form scanned into patient's electronic record for self reported past medical history, past surgical history, medications, allergies, family history, social history and 10 point review of systems    Examination:  Constitutional: Oriented to person, place, and time.  Appears well-developed and well-nourished.  Head: Normocephalic and atraumatic.  Eyes: Pupils are equal, round, and reactive to light.  Cardiovascular: Intact distal pulses.  Pulmonary/Chest/Breast: Effort normal. No respiratory distress.  Neurological: Alert and oriented to person, place, and time.  Skin: Skin is warm and dry.  Psychiatric: normal mood and affect.  Behavior is normal.  Musculoskeletal: Right hand reveals pretendinous cord formation to the middle and ring finger without joint contracture. Full finger extension and flexion. Nodule over the leading edge of the A1 pulley of the ring finger which is tender to touch. No evidence of triggering with finger flexion.        Impression:  Dupuytren's disease        Plan: We have discussed this is a progressive condition without a cure. At this point, she has no motion abnormalities or functional impairments so our recommendations are for observation alone. She can wear padded gloves when she needs  extra protection for forceful gripping or heavier activities. If she starts to notice a change in the ability to extend her fingers then she can return and we can discuss treatment options.     Follow up: As needed              Phi Wheatley MD  [facility]  Department of Orthopaedic Surgery  Hand and Upper Extremity Reconstruction      Scribe Attestation  By signing my name below, I Sadia Jared , Scribe   attest that this documentation has been prepared under the direction and in the presence of Dr. Phi Wheatley.      Dictation performed with the use of voice recognition software.  Syntax and grammatical errors may exist.

## 2025-04-15 ENCOUNTER — OFFICE VISIT (OUTPATIENT)
Dept: ORTHOPEDIC SURGERY | Facility: HOSPITAL | Age: 80
End: 2025-04-15
Payer: MEDICARE

## 2025-04-15 VITALS — WEIGHT: 135 LBS | HEIGHT: 63 IN | BODY MASS INDEX: 23.92 KG/M2

## 2025-04-15 DIAGNOSIS — M72.0 DUPUYTREN CONTRACTURE: ICD-10-CM

## 2025-04-15 PROCEDURE — 1036F TOBACCO NON-USER: CPT | Performed by: ORTHOPAEDIC SURGERY

## 2025-04-15 PROCEDURE — 99213 OFFICE O/P EST LOW 20 MIN: CPT | Performed by: ORTHOPAEDIC SURGERY

## 2025-04-15 PROCEDURE — 1159F MED LIST DOCD IN RCRD: CPT | Performed by: ORTHOPAEDIC SURGERY

## 2025-04-15 ASSESSMENT — PAIN - FUNCTIONAL ASSESSMENT: PAIN_FUNCTIONAL_ASSESSMENT: 0-10

## 2025-04-15 ASSESSMENT — PAIN SCALES - GENERAL: PAINLEVEL_OUTOF10: 5 - MODERATE PAIN

## 2025-04-15 ASSESSMENT — PAIN DESCRIPTION - DESCRIPTORS: DESCRIPTORS: ACHING;BURNING;SHARP

## 2025-04-15 NOTE — LETTER
April 15, 2025     Elli Quevedo DO  [address]  [facility], [facility] [address]  [facility] [address]    Patient: Xiomara Rader   YOB: 1945   Date of Visit: 4/15/2025       Dear Dr. Elli Quevedo DO:    Thank you for referring Xiomara Rader to me for evaluation. Below are my notes for this consultation.  If you have questions, please do not hesitate to call me. I look forward to following your patient along with you.       Sincerely,     Phi Wheatley MD      CC: No Recipients  ______________________________________________________________________________________    [facility]  Hand and Upper Extremity Service  Initial evaluation / Consultation         Consult requested by Referring Physician: Dr. Quevedo     Chief Complaint:Right hand          79 y.o left hand dominant female presenting for appearance of an abnormality in her right palm. She did research online and diagnosed herself with Dupuytren's disease. She indicates she is unaware of any of her siblings or parents having similar issues but her son does apparently have the same type of problem. Her primary concern is of pain over one of the nodules in her palm, particularly with gripping things but she has no motion abnormalities.          Please refer to New Patient Intake Form scanned into patient's electronic record for self reported past medical history, past surgical history, medications, allergies, family history, social history and 10 point review of systems    Examination:  Constitutional: Oriented to person, place, and time.  Appears well-developed and well-nourished.  Head: Normocephalic and atraumatic.  Eyes: Pupils are equal, round, and reactive to light.  Cardiovascular: Intact distal pulses.  Pulmonary/Chest/Breast: Effort normal. No respiratory distress.  Neurological: Alert and oriented to person, place, and time.  Skin: Skin is warm and dry.  Psychiatric: normal mood and affect.   Behavior is normal.  Musculoskeletal: Right hand reveals pretendinous cord formation to the middle and ring finger without joint contracture. Full finger extension and flexion. Nodule over the leading edge of the A1 pulley of the ring finger which is tender to touch. No evidence of triggering with finger flexion.        Impression:  Dupuytren's disease        Plan: We have discussed this is a progressive condition without a cure. At this point, she has no motion abnormalities or functional impairments so our recommendations are for observation alone. She can wear padded gloves when she needs extra protection for forceful gripping or heavier activities. If she starts to notice a change in the ability to extend her fingers then she can return and we can discuss treatment options.     Follow up: As needed              Phi Wheatley MD  [facility]  Department of Orthopaedic Surgery  Hand and Upper Extremity Reconstruction      Scribe Attestation  By signing my name below, I, Senaitjeffrey Coleman , [employer]   attest that this documentation has been prepared under the direction and in the presence of Dr. Phi Wheatley.      Dictation performed with the use of voice recognition software.  Syntax and grammatical errors may exist.

## 2025-04-23 ENCOUNTER — TELEPHONE (OUTPATIENT)
Dept: PRIMARY CARE | Facility: CLINIC | Age: 80
End: 2025-04-23

## 2025-05-02 ENCOUNTER — APPOINTMENT (OUTPATIENT)
Dept: PRIMARY CARE | Facility: CLINIC | Age: 80
End: 2025-05-02
Payer: MEDICARE

## 2025-05-02 ENCOUNTER — HOSPITAL ENCOUNTER (OUTPATIENT)
Dept: RADIOLOGY | Facility: CLINIC | Age: 80
Discharge: HOME | End: 2025-05-02
Payer: MEDICARE

## 2025-05-02 ENCOUNTER — OFFICE VISIT (OUTPATIENT)
Dept: PRIMARY CARE | Facility: CLINIC | Age: 80
End: 2025-05-02
Payer: MEDICARE

## 2025-05-02 VITALS
DIASTOLIC BLOOD PRESSURE: 64 MMHG | HEART RATE: 97 BPM | WEIGHT: 137 LBS | SYSTOLIC BLOOD PRESSURE: 112 MMHG | BODY MASS INDEX: 23.39 KG/M2 | OXYGEN SATURATION: 94 % | HEIGHT: 64 IN

## 2025-05-02 DIAGNOSIS — R05.3 CHRONIC COUGH: Primary | ICD-10-CM

## 2025-05-02 DIAGNOSIS — R05.3 CHRONIC COUGH: ICD-10-CM

## 2025-05-02 PROCEDURE — 99213 OFFICE O/P EST LOW 20 MIN: CPT | Performed by: SPECIALIST

## 2025-05-02 PROCEDURE — 71046 X-RAY EXAM CHEST 2 VIEWS: CPT

## 2025-05-02 PROCEDURE — 1036F TOBACCO NON-USER: CPT | Performed by: SPECIALIST

## 2025-05-02 PROCEDURE — G2211 COMPLEX E/M VISIT ADD ON: HCPCS | Performed by: SPECIALIST

## 2025-05-02 PROCEDURE — 3078F DIAST BP <80 MM HG: CPT | Performed by: SPECIALIST

## 2025-05-02 PROCEDURE — 1125F AMNT PAIN NOTED PAIN PRSNT: CPT | Performed by: SPECIALIST

## 2025-05-02 PROCEDURE — 1159F MED LIST DOCD IN RCRD: CPT | Performed by: SPECIALIST

## 2025-05-02 PROCEDURE — 1160F RVW MEDS BY RX/DR IN RCRD: CPT | Performed by: SPECIALIST

## 2025-05-02 PROCEDURE — 3074F SYST BP LT 130 MM HG: CPT | Performed by: SPECIALIST

## 2025-05-02 ASSESSMENT — ENCOUNTER SYMPTOMS
LOSS OF SENSATION IN FEET: 0
DEPRESSION: 0
OCCASIONAL FEELINGS OF UNSTEADINESS: 0

## 2025-05-02 ASSESSMENT — PAIN SCALES - GENERAL: PAINLEVEL_OUTOF10: 8

## 2025-05-02 NOTE — PROGRESS NOTES
"Subjective   Patient ID: Xiomara Rader is a 79 y.o. female who presents for Follow-up.  HPI      Cough for over a year, she attributes to Covid.  Cough comes/goes.  So does her taste.  When she lies down at night hears wheezing \"bagpipes\" clears with cough but then brings up mucous and some times has to get up and walk around to clear it.    Had episode 6 mos ago and went to Urgent care   Had albuterol inhaler from urgent care but never used it  Took Mucinex DM but mucous never went away but got thinner  She then spoke with pharmacist, told her to take an expectorant   Has deviated septum so always has trouble breathing.    Off of prednisone x 1 yr    Not taking pravastatin since it hurt so stopped, also saw cardio.  Takes half tablet 3 days a week    Flying to [facility] for a week    Allergies[1]   Current Outpatient Medications   Medication Instructions    amLODIPine (NORVASC) 2.5 mg, oral, Daily, Take along with 5mg    amLODIPine (NORVASC) 5 mg, oral, Daily, Take along with 2.5mg    loratadine (CLARITIN) 10 mg, Daily    pravastatin (PRAVACHOL) 10 mg, oral, Daily        Review of Systems  Constitutional  No fatigue, no fevers, no chills, no unintentional weight loss,   HEENT:  No headaches, no dizziness, sinus congestion, no rhinorrhea,  Cardiovascular:  No chest pain, no palpitations some times DOSS walking driveway and one flight of stairs  Respiratory:  Positive cough off/on usually dark yellow and thick, no hemoptysis, some times wheezing at night,  some times shortness of breath at rest  GI:  Some times ereflux, no abdominal pain, no nausea, no vomiting, no changes in bowel habits, no bright red blood per rectum, no melena    Physical Exam  BP 95/58 (BP Location: Left arm, Patient Position: Sitting, BP Cuff Size: Adult)   Pulse 97   Ht 1.626 m (5' 4\")   Wt 62.1 kg (137 lb)   SpO2 94%   BMI 23.52 kg/m²   General:    Well-appearing  F in no acute distress, well nourished, well hydrated  Head:  Normocephalic, " atraumatic  Skin:          Warm dry,   Eyes:  Anicteric sclera, pupils equal,   Ears:        TMs intact  Oral:      No posterior pharyngeal erythema or exudate   Neck:   Supple, no cervical/supraclavicular adenopathy  Cor:      Regular rate, normal S1, S2, no murmurs appreciated, no S3, no S4   Lungs:   Clear to auscultation b/l, no wheezes, no rhonchi, no crackles, no accessory respiratory muscle use, Positive wheezes with forced expiration      Assessment/Plan   Assessment & Plan  Chronic cough    Orders:    XR chest 2 views; Future       Said mother had sarcoid  Had covid x 2   Doesn't feel sick prefers to avoid abc  Will use albuterol prn for wheezing  To try 2 weeks claritin and if no resolution 2 weeks prilosec  If that does not resolve cough will need to see pulm and get pfts  Mucinex bid x 7 days      Elli Quevedo, DO        [1]   Allergies  Allergen Reactions    Acesulfame Unknown    Clindamycin Rash    Penicillins Rash    Sulfa (Sulfonamide Antibiotics) Rash

## 2025-06-07 DIAGNOSIS — I10 HYPERTENSION, UNSPECIFIED TYPE: ICD-10-CM

## 2025-06-09 ENCOUNTER — APPOINTMENT (OUTPATIENT)
Dept: RHEUMATOLOGY | Facility: CLINIC | Age: 80
End: 2025-06-09
Payer: MEDICARE

## 2025-06-09 RX ORDER — AMLODIPINE BESYLATE 5 MG/1
5 TABLET ORAL DAILY
Qty: 90 TABLET | Refills: 1 | Status: SHIPPED | OUTPATIENT
Start: 2025-06-09

## 2025-06-09 RX ORDER — AMLODIPINE BESYLATE 2.5 MG/1
2.5 TABLET ORAL DAILY
Qty: 90 TABLET | Refills: 1 | Status: SHIPPED | OUTPATIENT
Start: 2025-06-09

## 2025-06-11 ENCOUNTER — APPOINTMENT (OUTPATIENT)
Dept: DERMATOLOGY | Facility: CLINIC | Age: 80
End: 2025-06-11
Payer: MEDICARE

## 2025-06-11 DIAGNOSIS — Z85.820 PERSONAL HISTORY OF MALIGNANT MELANOMA OF SKIN: ICD-10-CM

## 2025-06-11 DIAGNOSIS — L57.8 ACTINIC SKIN DAMAGE: ICD-10-CM

## 2025-06-11 DIAGNOSIS — D48.5 NEOPLASM OF UNCERTAIN BEHAVIOR OF SKIN: Primary | ICD-10-CM

## 2025-06-11 DIAGNOSIS — L81.4 LENTIGO: ICD-10-CM

## 2025-06-11 DIAGNOSIS — D22.9 MULTIPLE BENIGN NEVI: ICD-10-CM

## 2025-06-11 DIAGNOSIS — Z12.83 SCREENING EXAM FOR SKIN CANCER: ICD-10-CM

## 2025-06-11 DIAGNOSIS — L82.1 SEBORRHEIC KERATOSIS: ICD-10-CM

## 2025-06-11 PROCEDURE — 1036F TOBACCO NON-USER: CPT | Performed by: DERMATOLOGY

## 2025-06-11 PROCEDURE — 11300 SHAVE SKIN LESION 0.5 CM/<: CPT | Performed by: DERMATOLOGY

## 2025-06-11 PROCEDURE — 99213 OFFICE O/P EST LOW 20 MIN: CPT | Performed by: DERMATOLOGY

## 2025-06-11 PROCEDURE — 1159F MED LIST DOCD IN RCRD: CPT | Performed by: DERMATOLOGY

## 2025-06-11 PROCEDURE — 1160F RVW MEDS BY RX/DR IN RCRD: CPT | Performed by: DERMATOLOGY

## 2025-06-11 ASSESSMENT — DERMATOLOGY QUALITY OF LIFE (QOL) ASSESSMENT
ARE THERE EXCLUSIONS OR EXCEPTIONS FOR THE QUALITY OF LIFE ASSESSMENT: NO
RATE HOW EMOTIONALLY BOTHERED YOU ARE BY YOUR SKIN PROBLEM (FOR EXAMPLE, WORRY, EMBARRASSMENT, FRUSTRATION): 0 - NEVER BOTHERED
DATE THE QUALITY-OF-LIFE ASSESSMENT WAS COMPLETED: 67367
RATE HOW BOTHERED YOU ARE BY SYMPTOMS OF YOUR SKIN PROBLEM (EG, ITCHING, STINGING BURNING, HURTING OR SKIN IRRITATION): 0 - NEVER BOTHERED
RATE HOW BOTHERED YOU ARE BY EFFECTS OF YOUR SKIN PROBLEMS ON YOUR ACTIVITIES (EG, GOING OUT, ACCOMPLISHING WHAT YOU WANT, WORK ACTIVITIES OR YOUR RELATIONSHIPS WITH OTHERS): 0 - NEVER BOTHERED

## 2025-06-11 ASSESSMENT — DERMATOLOGY PATIENT ASSESSMENT
DO YOU HAVE IRREGULAR MENSTRUAL CYCLES: NO
ARE YOU AN ORGAN TRANSPLANT RECIPIENT: NO
DO YOU USE A TANNING BED: NO
HAVE YOU HAD OR DO YOU HAVE A STAPH INFECTION: NO
ARE YOU ON BIRTH CONTROL: NO
ARE YOU TRYING TO GET PREGNANT: NO
HAVE YOU HAD OR DO YOU HAVE VASCULAR DISEASE: NO
DO YOU HAVE ANY NEW OR CHANGING LESIONS: NO
DO YOU USE SUNSCREEN: DAILY

## 2025-06-11 ASSESSMENT — PATIENT GLOBAL ASSESSMENT (PGA): PATIENT GLOBAL ASSESSMENT: PATIENT GLOBAL ASSESSMENT:  1 - CLEAR

## 2025-06-11 ASSESSMENT — ITCH NUMERIC RATING SCALE: HOW SEVERE IS YOUR ITCHING?: 0

## 2025-06-11 NOTE — Clinical Note
-Discussed the nature of the diagnosis  -Reassurance, recommend continued observation    -larger under right breast, discussed treatment with liquid nitrogen defers at this time

## 2025-06-11 NOTE — Clinical Note
Personal History of Malignant Melanoma  -Well healed scar(s) with no evidence of recurrence  -Discussed the need for regular full skin examinations in the office, and monthly self examinations at home  -Discussed the need for annual ophthalmology exams with dilation  -Discussed concerning lesions and when to return sooner if any changes noted in skin lesions. Patient verbalizes understanding.

## 2025-06-11 NOTE — Clinical Note
Melanoma 1: Melanoma in situYear Diagnosed:  2023. January.Location:  Right Dorsal Forearm.Treatment(s):  Mohs 4-4-23

## 2025-06-11 NOTE — Clinical Note
0.3 cm brown slightly raised papule with some horn pseudocysts but also possibly pigment globules at periphery

## 2025-06-11 NOTE — PROGRESS NOTES
Subjective     Xiomara Rader is a 80 y.o. female who presents for the following: Skin Check. Last derm visit 11/9/24 for seborrheic keratosis and lentigo with Sue Mayne APRN-CNP. Last Full Skin Exam 6/11/24. History of melanoma.     Intake Questions  Do you have any new or changing Lesions?: No  Are you an organ transplant recipient?: No  Have you had or do you have a Staph Infection?: No  Have you had or do you have Vacular Disease?: No  Do you use sunscreen?: Daily  Do you use a tanning bed?: No  Are you trying to get pregnant?: No  Are you on birth control?: No  Do you have irregular menstrual cycles?: No    Review of Systems:  No other skin or systemic complaints other than what is documented elsewhere in the note.    The following portions of the chart were reviewed this encounter and updated as appropriate:  Tobacco  Allergies  Meds  Problems  Med Hx  Surg Hx         Skin Cancer History  Biopsy Log Book  No skin cancers from Specimen Tracking.    Additional History    Melanoma 1: Melanoma in situYear Diagnosed:  2023. January.Location:  Right Dorsal Forearm.Treatment(s):  Mohs 4-4-23          Specialty Problems          Dermatology Problems    History of melanoma    Rash        Objective   Well appearing patient in no apparent distress; mood and affect are within normal limits.    A full examination was performed including scalp, head, eyes, ears, nose, lips, neck, chest, axillae, abdomen, back, buttocks, bilateral upper extremities, bilateral lower extremities, hands, feet, fingers, toes, fingernails, and toenails. All findings within normal limits unless otherwise noted below.    Assessment/Plan   Skin Exam  1. NEOPLASM OF UNCERTAIN BEHAVIOR OF SKIN  Right Dorsal Forearm - Distal to scar  0.3 cm brown slightly raised papule with some horn pseudocysts but also possibly pigment globules at periphery    Shave removal    Lesion length (cm):  0.3  Lesion width (cm):  0.3  Margin per side (cm):  0.1  Lesion  diameter (cm):  0.5  Informed consent: discussed and consent obtained    Timeout: patient name, date of birth, surgical site, and procedure verified    Procedure prep:  Patient was prepped and draped  Anesthesia: the lesion was anesthetized in a standard fashion    Anesthetic:  1% lidocaine w/ epinephrine 1-100,000 local infiltration  Instrument used: DermaBlade    Hemostasis achieved with: aluminum chloride    Outcome: patient tolerated procedure well    Post-procedure details: sterile dressing applied and wound care instructions given    Dressing type: bandage and petrolatum      Staff Communication: Dermatology Local Anesthesia: Lidocaine 0.5% with Epinephrine 1;200,000 - Amount: 3 ml  Specimen 1 - Dermatopathology- DERM LAB  Differential Diagnosis: seborrheic keratosis vs melanoma; history of melanoma in situ  Check Margins Yes/No?:    Comments:    Dermpath Lab: Routine Histopathology (formalin-fixed tissue)  2. MULTIPLE BENIGN NEVI  Generalized  Brown and tan macules and papules with reassuring findings on dermoscopy  -These lesions have benign, reassuring patterns on dermoscopy  -Recommend continued self observation, and to contact the office if any changes in nevi are noticed  3. LENTIGO  Generalized  Tan macules  -Benign appearing on exam  -Reassurance, recommend observation  4. SEBORRHEIC KERATOSIS  Generalized  Stuck on, waxy macule(s)/papule(s)/plaque(s) with comedo-like openings and milia like cysts  -Discussed the nature of the diagnosis  -Reassurance, recommend continued observation    -larger under right breast, discussed treatment with liquid nitrogen defers at this time  5. ACTINIC SKIN DAMAGE  Generalized  Background of photodamage with hyper- and hypo-pigmented macules on the skin  6. PERSONAL HISTORY OF MALIGNANT MELANOMA OF SKIN  Generalized  Lymph node basins palpated in relevant regions without appreciable adenopathy; regions include the neck and/or supraclavicular and/or axillary and/or  inguinal and/or popliteal skin.  Personal History of Malignant Melanoma  -Well healed scar(s) with no evidence of recurrence  -Discussed the need for regular full skin examinations in the office, and monthly self examinations at home  -Discussed the need for annual ophthalmology exams with dilation  -Discussed concerning lesions and when to return sooner if any changes noted in skin lesions. Patient verbalizes understanding.  7. SCREENING EXAM FOR SKIN CANCER  Generalized    Full body skin exam  -No lesions concerning for malignancy on the remainder the skin exam today   - The ugly duckling sign was discussed. Monitor for any skin lesions that are different in color, shape, or size than others on body  -Sun protection was discussed. Recommend SPF 30+, hats with brims, sun protective clothing, and avoiding sun exposure between 10 AM and 2 PM whenever possible  -Recommend regular skin exams or sooner if new or changing lesions     Related Procedures  Follow Up In Dermatology - Established Patient  Follow Up In Dermatology - Established Patient    Follow up 1 year Full Skin Exam

## 2025-06-13 ENCOUNTER — PATIENT MESSAGE (OUTPATIENT)
Dept: DERMATOLOGY | Facility: CLINIC | Age: 80
End: 2025-06-13
Payer: MEDICARE

## 2025-06-13 LAB
LABORATORY COMMENT REPORT: NORMAL
PATH REPORT.FINAL DX SPEC: NORMAL
PATH REPORT.GROSS SPEC: NORMAL
PATH REPORT.RELEVANT HX SPEC: NORMAL
PATH REPORT.TOTAL CANCER: NORMAL

## 2025-06-30 ENCOUNTER — TELEPHONE (OUTPATIENT)
Dept: PRIMARY CARE | Facility: CLINIC | Age: 80
End: 2025-06-30

## 2025-08-05 ENCOUNTER — APPOINTMENT (OUTPATIENT)
Dept: PRIMARY CARE | Facility: CLINIC | Age: 80
End: 2025-08-05
Payer: MEDICARE

## 2025-08-05 VITALS
HEIGHT: 64 IN | WEIGHT: 137 LBS | DIASTOLIC BLOOD PRESSURE: 64 MMHG | SYSTOLIC BLOOD PRESSURE: 104 MMHG | OXYGEN SATURATION: 94 % | BODY MASS INDEX: 23.39 KG/M2 | HEART RATE: 80 BPM

## 2025-08-05 DIAGNOSIS — R79.89 ABNORMAL THYROID BLOOD TEST: ICD-10-CM

## 2025-08-05 DIAGNOSIS — Z76.89 ENCOUNTER TO ESTABLISH CARE: Primary | ICD-10-CM

## 2025-08-05 DIAGNOSIS — I10 PRIMARY HYPERTENSION: ICD-10-CM

## 2025-08-05 DIAGNOSIS — M17.11 ARTHRITIS OF RIGHT KNEE: ICD-10-CM

## 2025-08-05 DIAGNOSIS — R79.89 ELEVATED TSH: ICD-10-CM

## 2025-08-05 DIAGNOSIS — Z87.39 HISTORY OF POLYMYALGIA RHEUMATICA: ICD-10-CM

## 2025-08-05 DIAGNOSIS — Z12.31 ENCOUNTER FOR SCREENING MAMMOGRAM FOR MALIGNANT NEOPLASM OF BREAST: ICD-10-CM

## 2025-08-05 DIAGNOSIS — E78.5 HYPERLIPIDEMIA, UNSPECIFIED HYPERLIPIDEMIA TYPE: ICD-10-CM

## 2025-08-05 PROCEDURE — 3074F SYST BP LT 130 MM HG: CPT

## 2025-08-05 PROCEDURE — 1126F AMNT PAIN NOTED NONE PRSNT: CPT

## 2025-08-05 PROCEDURE — 99214 OFFICE O/P EST MOD 30 MIN: CPT

## 2025-08-05 PROCEDURE — 3078F DIAST BP <80 MM HG: CPT

## 2025-08-05 PROCEDURE — G2211 COMPLEX E/M VISIT ADD ON: HCPCS

## 2025-08-05 PROCEDURE — 1159F MED LIST DOCD IN RCRD: CPT

## 2025-08-05 RX ORDER — ROSUVASTATIN CALCIUM 5 MG/1
5 TABLET, COATED ORAL DAILY
COMMUNITY

## 2025-08-05 ASSESSMENT — ENCOUNTER SYMPTOMS
WHEEZING: 0
ABDOMINAL PAIN: 0
OCCASIONAL FEELINGS OF UNSTEADINESS: 0
FATIGUE: 0
COUGH: 0
DIZZINESS: 0
CHEST TIGHTNESS: 0
FEVER: 0
NECK PAIN: 0
DEPRESSION: 0
JOINT SWELLING: 0
NUMBNESS: 0
UNEXPECTED WEIGHT CHANGE: 0
CHILLS: 0
LIGHT-HEADEDNESS: 0
PALPITATIONS: 0
SEIZURES: 0
BACK PAIN: 0
SHORTNESS OF BREATH: 0
ARTHRALGIAS: 1
LOSS OF SENSATION IN FEET: 0
HEADACHES: 0

## 2025-08-05 ASSESSMENT — PATIENT HEALTH QUESTIONNAIRE - PHQ9
1. LITTLE INTEREST OR PLEASURE IN DOING THINGS: NOT AT ALL
SUM OF ALL RESPONSES TO PHQ9 QUESTIONS 1 AND 2: 0
2. FEELING DOWN, DEPRESSED OR HOPELESS: NOT AT ALL

## 2025-08-05 ASSESSMENT — PAIN SCALES - GENERAL: PAINLEVEL_OUTOF10: 0-NO PAIN

## 2025-08-05 NOTE — PROGRESS NOTES
Subjective   Patient ID: Xiomara Rader is a 80 y.o. female who presents for Establish Care (NPV).  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 States she began taking her statin again this past week     -right knee arthritis- knee will ache and right shoulder ache- does stretching exercises with relief- [facility] to work on weights    -HTN- amlodipine 7.5- does not check at home - denies chest pain, palpitations, leg swelling, blurry vision, headaches    -HLD- states she started taking rosuvastatin 5 mg about a week ago-  has had some aches and pains took some walks yesterday- is unsure if it is because of this- pain in hips and lower back    -pt also states she was having bad acid reflux- has tried prilosec otc with relief - has been taking probiotic- states the reflux has resolved- she manages it with diet and lifestyle     -varicose veins on bilaeral legs     - hx of deviated septum- states she does not want surgery   PShx: left tkr-2021, appendectomy, hysterectomy, rectocele surgery    Social hx: etoh- social few per week , no tobacco use, no illicit drug use   Watches diet- fruits and vegetables and exercise- walks daily, weight machines at [facility]   immunizations - gets flu and covid- prior rsv, shingrix x2, tdap 2021,   Screenings-  -colonoscopy- defer d/t age-  "patient does not want   -mammogram- order placed   -dexa- April 2023- normal- takes calcium and vitamin d daily - patient states she does not want this-educated- still does not want - no recent falls or fractures    FMhx: mother- passed at 94 yo with chf, htn father- passed from old age- 93 yo siblings   Sister with chf- turned 84 yo yesterday      Past medical, surgical, social, and family history all reviewed     patient states feeling well, no complaints at this time   denies N/V, headache, dizziness, CP, SOB, palpitations, edema, numbness, tingling, weakness    Review of Systems   Constitutional:  Negative for chills, fatigue, fever and unexpected weight change.   HENT:  Negative for congestion, ear pain, postnasal drip, sinus pressure and sinus pain.    Respiratory:  Negative for cough, chest tightness, shortness of breath and wheezing.    Cardiovascular:  Negative for chest pain, palpitations and leg swelling.   Gastrointestinal:  Negative for abdominal pain, blood in stool, constipation, diarrhea, nausea and vomiting.   Endocrine: Negative for cold intolerance, polydipsia and polyuria.   Genitourinary:  Negative for difficulty urinating, dysuria, flank pain, frequency, hematuria and urgency.   Musculoskeletal:  Positive for arthralgias. Negative for back pain, joint swelling and neck pain.        Right knee and shoulder pain- chronic   Skin:  Negative for rash.        Hx melanoma   Neurological:  Negative for dizziness, tremors, seizures, syncope, weakness, light-headedness, numbness and headaches.   Hematological:  Does not bruise/bleed easily.   Psychiatric/Behavioral:  Negative for confusion.        Objective   /64 (BP Location: Left arm, Patient Position: Sitting, BP Cuff Size: Adult)   Pulse 80   Ht 1.626 m (5' 4\")   Wt 62.1 kg (137 lb)   SpO2 94%   BMI 23.52 kg/m²     Physical Exam  Vitals and nursing note reviewed.   Constitutional:       Appearance: Normal appearance.     Eyes:      " Extraocular Movements: Extraocular movements intact.       Cardiovascular:      Rate and Rhythm: Normal rate and regular rhythm.      Pulses: Normal pulses.      Heart sounds: Normal heart sounds.   Pulmonary:      Effort: Pulmonary effort is normal.      Breath sounds: Normal breath sounds.   Genitourinary:     Comments: Per gyn      Musculoskeletal:         General: Normal range of motion.      Cervical back: Normal range of motion. No rigidity or tenderness.     Skin:     General: Skin is warm and dry.      Comments: Varicose veins bilateral legs     Neurological:      General: No focal deficit present.      Mental Status: She is alert and oriented to person, place, and time. Mental status is at baseline.     Psychiatric:         Mood and Affect: Mood normal.         Behavior: Behavior normal.         Thought Content: Thought content normal.         Judgment: Judgment normal.         Assessment & Plan  Encounter to establish care  -patient to schedule medicare wellness visit in 4-6 months  -discussed rosuvastatin, need for, side effects, pt understands and will follow up in one week on how she is doing  -discussed risks, complications of uncontrolled cholesterol, pt understands            Encounter for screening mammogram for malignant neoplasm of breast  -she will call and schedule this  -will follow up on results    Orders:    BI mammo bilateral screening tomosynthesis; Future    Primary hypertension    -stable  -fair control  -Continue current medications, side effects, risks and options discussed, patient aware   -Encouraged dietary sodium restriction/ DASH diet  -Recommend regular aerobic exercise  -Discussed need and benefit for weight loss  -Recheck in 4-6 months or sooner should any symptoms or problems arise  - Goal of blood pressure less than 130/80    Orders:    Comprehensive Metabolic Panel; Future    Lipid Panel; Future    Elevated TSH  -elevated TSH in past- will repeat and follow up on results    Orders:    TSH with reflex to Free T4 if abnormal; Future    Abnormal thyroid blood test    Orders:    TSH with reflex to Free T4 if abnormal; Future    Hyperlipidemia, unspecified hyperlipidemia type  -rosuvastatin 5 mg  Orders:    TSH with reflex to Free T4 if abnormal; Future    History of polymyalgia rheumatica  -tylenol for pain as needed        Arthritis of right knee  -tylenol for pain as needed      complexity visit of 55+  minutes face-to-face with at least half of the time discussing  Results of my examination, clinical findings, impression and diagnoses discussed with the patient as well as results of the latest test/lab work. The patient was in agreement with the plan and verbalized a good understanding of instructions and plans for treatment. At the end of the visit today, the patient felt that all questions had been answered satisfactorily. The patient was pleased with the visit and very appreciative for the care rendered.          -Go to ER with any new or worsening pain, chest pain, SOB, numbness, tingling, dizziness.          Angy Barroso PA-C 08/10/25 5:02 PM

## 2025-08-05 NOTE — ASSESSMENT & PLAN NOTE
-she will call and schedule this  -will follow up on results    Orders:    BI mammo bilateral screening tomosynthesis; Future

## 2025-08-06 NOTE — ASSESSMENT & PLAN NOTE
-stable  -fair control  -Continue current medications, side effects, risks and options discussed, patient aware   -Encouraged dietary sodium restriction/ DASH diet  -Recommend regular aerobic exercise  -Discussed need and benefit for weight loss  -Recheck in 4-6 months or sooner should any symptoms or problems arise  - Goal of blood pressure less than 130/80    Orders:    Comprehensive Metabolic Panel; Future    Lipid Panel; Future

## 2025-08-08 LAB
ALBUMIN SERPL-MCNC: 4.2 G/DL (ref 3.6–5.1)
ALP SERPL-CCNC: 89 U/L (ref 37–153)
ALT SERPL-CCNC: 15 U/L (ref 6–29)
ANION GAP SERPL CALCULATED.4IONS-SCNC: 7 MMOL/L (CALC) (ref 7–17)
AST SERPL-CCNC: 19 U/L (ref 10–35)
BILIRUB SERPL-MCNC: 0.5 MG/DL (ref 0.2–1.2)
BUN SERPL-MCNC: 20 MG/DL (ref 7–25)
CALCIUM SERPL-MCNC: 9.2 MG/DL (ref 8.6–10.4)
CHLORIDE SERPL-SCNC: 106 MMOL/L (ref 98–110)
CHOLEST SERPL-MCNC: 163 MG/DL
CHOLEST/HDLC SERPL: 2.1 (CALC)
CO2 SERPL-SCNC: 28 MMOL/L (ref 20–32)
CREAT SERPL-MCNC: 0.76 MG/DL (ref 0.6–1)
EGFRCR SERPLBLD CKD-EPI 2021: 80 ML/MIN/1.73M2
GLUCOSE SERPL-MCNC: 87 MG/DL (ref 65–99)
HDLC SERPL-MCNC: 78 MG/DL
LDLC SERPL CALC-MCNC: 66 MG/DL (CALC)
NONHDLC SERPL-MCNC: 85 MG/DL (CALC)
POTASSIUM SERPL-SCNC: 4 MMOL/L (ref 3.5–5.3)
PROT SERPL-MCNC: 6.4 G/DL (ref 6.1–8.1)
SODIUM SERPL-SCNC: 141 MMOL/L (ref 135–146)
TRIGL SERPL-MCNC: 100 MG/DL
TSH SERPL-ACNC: 3.13 MIU/L (ref 0.4–4.5)

## 2025-08-10 ASSESSMENT — ENCOUNTER SYMPTOMS
DIFFICULTY URINATING: 0
FREQUENCY: 0
CONFUSION: 0
BLOOD IN STOOL: 0
DYSURIA: 0
SINUS PAIN: 0
VOMITING: 0
SINUS PRESSURE: 0
DIARRHEA: 0
WEAKNESS: 0
FLANK PAIN: 0
CONSTIPATION: 0
POLYDIPSIA: 0
HEMATURIA: 0
TREMORS: 0
NAUSEA: 0
BRUISES/BLEEDS EASILY: 0

## 2025-08-22 ENCOUNTER — APPOINTMENT (OUTPATIENT)
Dept: PRIMARY CARE | Facility: CLINIC | Age: 80
End: 2025-08-22
Payer: MEDICARE

## 2025-10-06 ENCOUNTER — APPOINTMENT (OUTPATIENT)
Dept: PRIMARY CARE | Facility: CLINIC | Age: 80
End: 2025-10-06
Payer: MEDICARE

## 2026-06-10 ENCOUNTER — APPOINTMENT (OUTPATIENT)
Dept: DERMATOLOGY | Facility: CLINIC | Age: 81
End: 2026-06-10
Payer: MEDICARE